# Patient Record
Sex: FEMALE | Race: WHITE | Employment: FULL TIME | URBAN - METROPOLITAN AREA
[De-identification: names, ages, dates, MRNs, and addresses within clinical notes are randomized per-mention and may not be internally consistent; named-entity substitution may affect disease eponyms.]

---
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
## 2019-01-29 NOTE — PROGRESS NOTES
SUBJECTIVE:   Ana Joshi is a 17 year old female who presents to clinic today for the following health issues:      Abnormal Mood Symptoms  Onset: August     Description:   Depression: no- feeling sad   Anxiety: YES- worry about school, work, and friends     Accompanying Signs & Symptoms:  Still participating in activities that you used to enjoy: no  Fatigue: YES  Irritability: no  Difficulty concentrating: YES  Changes in appetite: YES- fluctuates   Problems with sleep: no  Heart racing/beating fast : YES- heart beating   Thoughts of hurting yourself or others: none    History:   Recent stress: YES- college   Prior depression hospitalization: None  Family history of depression: no  Family history of anxiety: YES- mother     Precipitating factors:   Alcohol/drug use: no    Alleviating factors:      Therapies Tried and outcome: None  Patient is student at [employer], [place] , first semester, taking pre-Nursing (A & P and struggling with keeping up).  She has seen a school counselor which was helpful and comes in today for evaluation.  No prior history of depression/anxiety.  Patient denies feeling depressed but is constantly anxious about her schoolwork.  She is not sleeping well, no SI/HI.  PHQ-9=8, DAGO-7=11.  No history of ADHD, grades were good in High School.  She gets along well with her peers.    Problem list and histories reviewed & adjusted, as indicated.  Additional history: as documented    Patient Active Problem List   Diagnosis     Hyperhydrosis disorder     Acne     Infectious mononucleosis     Hx of excision of hemangioma     Snoring     Past Surgical History:   Procedure Laterality Date     TRACHEOSTOMY CHILD  birth to 2 yo     Trached until age 1 and lazer surgeries       Social History   Substance Use Topics     Smoking status: Never Smoker     Smokeless tobacco: Never Used     Alcohol use No     Family History   Problem Relation Age of Onset     Allergies Mother      Penicillin, maybe seasonal  "    Unknown/Adopted Father      Asthma Father      Allergies Father      CANCER Maternal Grandmother      Lung     CANCER Maternal Grandfather      Lymphatic     HEART DISEASE Maternal Uncle 1      of CHF         BP Readings from Last 3 Encounters:   17 131/75   17 116/70   17 109/73    Wt Readings from Last 3 Encounters:   17 159 lb 3.2 oz (72.2 kg) (89 %)*   17 151 lb (68.5 kg) (85 %)*   17 152 lb 4 oz (69.1 kg) (87 %)*     * Growth percentiles are based on CDC 2-20 Years data.                  Labs reviewed in EPIC        Reviewed and updated as needed this visit by clinical staff       Reviewed and updated as needed this visit by Provider         ROS:  Constitutional, HEENT, cardiovascular, pulmonary, gi and gu systems are negative, except as otherwise noted.      OBJECTIVE:   /75 (BP Location: Left arm, Patient Position: Sitting, Cuff Size: Adult Regular)  Pulse 82  Temp 98  F (36.7  C) (Oral)  Ht 5' 1\" (1.549 m)  Wt 159 lb 3.2 oz (72.2 kg)  SpO2 98%  BMI 30.08 kg/m2  Body mass index is 30.08 kg/(m^2).  GENERAL: healthy, alert and no distress  EYES: Eyes grossly normal to inspection, PERRL and conjunctivae and sclerae normal  HENT: ear canals and TM's normal, nose and mouth without ulcers or lesions  NECK: no adenopathy, no asymmetry, masses, or scars and thyroid normal to palpation  RESP: lungs clear to auscultation - no rales, rhonchi or wheezes  CV: regular rate and rhythm, normal S1 S2, no S3 or S4, no murmur, click or rub, no peripheral edema and peripheral pulses strong  ABDOMEN: soft, nontender, no hepatosplenomegaly, no masses and bowel sounds normal  MS: no gross musculoskeletal defects noted, no edema  SKIN: no suspicious lesions or rashes  PSYCH: mentation appears normal, affect normal/bright  LYMPH: normal ant/post cervical, supraclavicular nodes    Diagnostic Test Results:  none     ASSESSMENT/PLAN:         BP Screening:   Last 3 BP Readings:  " "  BP Readings from Last 3 Encounters:   11/22/17 131/75   04/03/17 116/70   01/27/17 109/73       The following was recommended to the patient:  Re-screen BP within a year and recommended lifestyle modifications  BMI:   Estimated body mass index is 30.08 kg/(m^2) as calculated from the following:    Height as of this encounter: 5' 1\" (1.549 m).    Weight as of this encounter: 159 lb 3.2 oz (72.2 kg).   Weight management plan: Discussed healthy diet and exercise guidelines and patient will follow up in 12 months in clinic to re-evaluate.        1. Mild single current episode of major depressive disorder (H)  We discussed at length the management of mild depression with SSRI, counseling, combination of SSRI//counseling.  She is not interested in taking a medication at this time but is willing to continue with counseling. DAP reviewed.    2. Anxiety  Patient is willing to continue with counseling through her school which I encouraged.  Return to clinic if not improved, new, or worsening symptoms and we can start SSRI if necessary.    Patient is not sexually active, declines GC screening at this time.  Approximately 25 minutes were spent face-to-face with patient and greater than 50% of that time was spent on counseling and coordination of care.     See Patient Instructions    AYDEE Jimenez [facility]  " details…

## 2019-03-21 DIAGNOSIS — F32.1 MODERATE SINGLE CURRENT EPISODE OF MAJOR DEPRESSIVE DISORDER (H): ICD-10-CM

## 2019-03-21 NOTE — TELEPHONE ENCOUNTER
"Requested Prescriptions   Pending Prescriptions Disp Refills     sertraline (ZOLOFT) 50 MG tablet 90 tablet 1          Last Written Prescription Date:  8/9/18  Last Fill Quantity: 90,  # refills: 1   Last Office Visit with G, P or [facility] prescribing provider:  8/9/18   Future Office Visit:      Sig: Take 1 tablet (50 mg) by mouth daily    SSRIs Protocol Failed - 3/21/2019 11:00 AM       Failed - PHQ-9 score less than 5 in past 6 months    Please review last PHQ-9 score.          Failed - Recent (6 mo) or future (30 days) visit within the authorizing provider's specialty    Patient had office visit in the last 6 months or has a visit in the next 30 days with authorizing provider or within the authorizing provider's specialty.  See \"Patient Info\" tab in inbasket, or \"Choose Columns\" in Meds & Orders section of the refill encounter.           Passed - Medication is active on med list       Passed - Patient is age 18 or older       Passed - No active pregnancy on record       Passed - No positive pregnancy test in last 12 months              Trevon Faarax  [facility]  "

## 2019-03-21 NOTE — LETTER
Ana Joshi  [address]  [facility] [place] [address]          03/29/19      Dear Ana Joshi        At [facility] we care about your health and are committed to providing quality patient care. Regular appointments are a vital part of the care and management of your health and can help prevent many of the complications that can occur.      It has come to our attention that you are due for an office visit to address your refill request. Please call [facility] at [phone] soon to schedule your appointment.    If you have transferred care to another clinic please call to inform us so that we do not continue to send you reminder letters.      Sincerely,      [facility] Care Team

## 2019-03-22 NOTE — TELEPHONE ENCOUNTER
Routing refill request to provider for review/approval because:  Patient has not been seen in the last six months.    Alyssa Navarro RN

## 2019-03-26 DIAGNOSIS — F32.1 MODERATE SINGLE CURRENT EPISODE OF MAJOR DEPRESSIVE DISORDER (H): ICD-10-CM

## 2019-03-26 NOTE — TELEPHONE ENCOUNTER
This writer attempted to contact 1 on 03/26/19      Reason for call schedule and left message.      If patient calls back:   Schedule Telephone Visit appointment within 1 month with PCP, document that pt called and close encounter       [email or web address], MEDICAL ASSISTANT

## 2019-03-27 NOTE — TELEPHONE ENCOUNTER
Duplicate request. See 3/21/19 refill request in Chart Review. Medication was denied to pharmacy per provider, patient needs E-visit or to be seen for any further refills.    Lucinda Mott RN

## 2019-03-28 NOTE — TELEPHONE ENCOUNTER
This writer attempted to contact Patient on 03/28/19      Reason for call Medication not refilled patient needs an office visit and left message.      If patient calls back:   Schedule Office Visit appointment within 1 week with PCP, document that pt called and close encounter         Jyoti Bennett MA

## 2019-04-24 ENCOUNTER — VIRTUAL VISIT (OUTPATIENT)
Dept: FAMILY MEDICINE | Facility: CLINIC | Age: 19
End: 2019-04-24
Payer: COMMERCIAL

## 2019-04-24 DIAGNOSIS — F32.1 MODERATE SINGLE CURRENT EPISODE OF MAJOR DEPRESSIVE DISORDER (H): ICD-10-CM

## 2019-04-24 PROCEDURE — 99441 ZZC PHYSICIAN TELEPHONE EVALUATION 5-10 MIN: CPT | Performed by: NURSE PRACTITIONER

## 2019-04-24 ASSESSMENT — ANXIETY QUESTIONNAIRES
6. BECOMING EASILY ANNOYED OR IRRITABLE: SEVERAL DAYS
2. NOT BEING ABLE TO STOP OR CONTROL WORRYING: MORE THAN HALF THE DAYS
IF YOU CHECKED OFF ANY PROBLEMS ON THIS QUESTIONNAIRE, HOW DIFFICULT HAVE THESE PROBLEMS MADE IT FOR YOU TO DO YOUR WORK, TAKE CARE OF THINGS AT HOME, OR GET ALONG WITH OTHER PEOPLE: SOMEWHAT DIFFICULT
3. WORRYING TOO MUCH ABOUT DIFFERENT THINGS: SEVERAL DAYS
5. BEING SO RESTLESS THAT IT IS HARD TO SIT STILL: SEVERAL DAYS
7. FEELING AFRAID AS IF SOMETHING AWFUL MIGHT HAPPEN: NOT AT ALL
1. FEELING NERVOUS, ANXIOUS, OR ON EDGE: NEARLY EVERY DAY
GAD7 TOTAL SCORE: 9

## 2019-04-24 ASSESSMENT — PATIENT HEALTH QUESTIONNAIRE - PHQ9
SUM OF ALL RESPONSES TO PHQ QUESTIONS 1-9: 9
5. POOR APPETITE OR OVEREATING: SEVERAL DAYS

## 2019-04-24 NOTE — PROGRESS NOTES
Patient opted to conduct today's return visit via telephone vs an in person visit to the clinic.  Shelbi Joshi MA    I spoke with: Ana    The reason for the telephone visit was:     Pertinent history and review of systems: Patient has been taking Zoloft 100 mg daily and doing well.  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  She is sleeping well, denies adverse side effect, no issues with medication compliance.    Assessment: Moderate single current episode of major depressive disorder.    Advice/instructions given to patient/guardian including prescriptions, follow up appointment or orders for diagnostic testing: follow back 1 month for PE/labs, follow up depression    Phone call contact time    Call Started at 2:46    Call Ended at: 2:53       SUBJECTIVE:   Ana Joshi is a 18 year old female who presents to clinic today for the following   health issues:      Depression and Anxiety Follow-Up    Status since last visit: Improved     Other associated symptoms:None    Complicating factors:     Significant life event: Yes-  Will be in a wedding soon     Current substance abuse: None    PHQ 1/15/2018 6/29/2018 7/10/2018   PHQ-9 Total Score 5 0 1   Q9: Thoughts of better off dead/self-harm past 2 weeks Not at all Not at all Not at all     DAGO-7 SCORE 12/28/2017 1/15/2018 6/29/2018   Total Score 7 3 1     In the past two weeks have you had thoughts of suicide or self-harm?  No.    Do you have concerns about your personal safety or the safety of others?   No  PHQ-9  English  PHQ-9   Any Language  DAGO-7  Suicide Assessment Five-step Evaluation and Treatment (SAFE-T)    Amount of exercise or physical activity: 4-5 days/week for an average of 45-60 minutes    Problems taking medications regularly: No    Medication side effects: night terrors    Diet:  regular (no restrictions)      Additional history: as documented    Reviewed  and updated as needed this visit by clinical staff         Reviewed and updated as needed this visit by Provider         Patient Active Problem List   Diagnosis     Hyperhydrosis disorder     Acne     Infectious mononucleosis     Hx of excision of hemangioma     Snoring     Moderate single current episode of major depressive disorder (H)     Class 1 obesity due to excess calories with body mass index (BMI) of 31.0 to 31.9 in adult, unspecified whether serious comorbidity present     Past Surgical History:   Procedure Laterality Date     TRACHEOSTOMY CHILD  birth to 1 yo     Trached until age 1 and lazer surgeries       Social History     Tobacco Use     Smoking status: Never Smoker     Smokeless tobacco: Never Used   Substance Use Topics     Alcohol use: No     Family History   Problem Relation Age of Onset     Allergies Mother         Penicillin, maybe seasonal     Anxiety Disorder Mother      Unknown/Adopted Father      Asthma Father      Allergies Father      Cancer Maternal Grandmother         Lung     Other Cancer Maternal Grandmother         bone     Thyroid Disease Maternal Grandmother      Cancer Maternal Grandfather         Lymphatic     Hypertension Maternal Grandfather      Cerebrovascular Disease Maternal Grandfather      Other Cancer Maternal Grandfather      Heart Disease Maternal Uncle 1         of CHF         Current Outpatient Medications   Medication Sig Dispense Refill     norethindrone-ethinyl estradiol (ALAYCEN ) 1-35 MG-MCG per tablet Take 1 tablet by mouth daily 84 tablet 3     sertraline (ZOLOFT) 50 MG tablet Take 1 tablet (50 mg) by mouth daily 90 tablet 1     BP Readings from Last 3 Encounters:   18 139/83   18 111/76   18 125/82    Wt Readings from Last 3 Encounters:   18 69.9 kg (154 lb) (85 %)*   18 71.2 kg (157 lb) (87 %)*   18 69.9 kg (154 lb) (85 %)*     * Growth  percentiles are based on CDC (Girls, 2-20 Years) data.                    ROS:  Constitutional, HEENT, cardiovascular, pulmonary, gi and gu systems are negative, except as otherwise noted.    OBJECTIVE:     There were no vitals taken for this visit.  There is no height or weight on file to calculate BMI.  Exam not performed/phone visit    Diagnostic Test Results:  none     ASSESSMENT/PLAN:       1. Moderate single current episode of major depressive disorder (H)  Refilled Zoloft, patient to follow back for PE after she is home from school at which time we'll also follow up on her depression.  Call for new/worsening symptoms.  - sertraline (ZOLOFT) 50 MG tablet; Take 1 tablet (50 mg) by mouth daily  Dispense: 90 tablet; Refill: 1    See Patient Instructions    AYDEE Jimenez [facility]

## 2019-04-25 ASSESSMENT — ANXIETY QUESTIONNAIRES: GAD7 TOTAL SCORE: 9

## 2019-05-31 ENCOUNTER — OFFICE VISIT (OUTPATIENT)
Dept: FAMILY MEDICINE | Facility: CLINIC | Age: 19
End: 2019-05-31
Payer: COMMERCIAL

## 2019-05-31 VITALS
TEMPERATURE: 98.1 F | SYSTOLIC BLOOD PRESSURE: 125 MMHG | WEIGHT: 165.4 LBS | HEIGHT: 61 IN | DIASTOLIC BLOOD PRESSURE: 85 MMHG | OXYGEN SATURATION: 95 % | BODY MASS INDEX: 31.23 KG/M2 | HEART RATE: 107 BPM

## 2019-05-31 DIAGNOSIS — F32.1 MODERATE SINGLE CURRENT EPISODE OF MAJOR DEPRESSIVE DISORDER (H): ICD-10-CM

## 2019-05-31 DIAGNOSIS — Z11.4 ENCOUNTER FOR SCREENING FOR HIV: ICD-10-CM

## 2019-05-31 DIAGNOSIS — Z00.00 ROUTINE HISTORY AND PHYSICAL EXAMINATION OF ADULT: Primary | ICD-10-CM

## 2019-05-31 DIAGNOSIS — Z13.6 CARDIOVASCULAR SCREENING; LDL GOAL LESS THAN 160: ICD-10-CM

## 2019-05-31 DIAGNOSIS — E66.09 CLASS 1 OBESITY DUE TO EXCESS CALORIES WITH BODY MASS INDEX (BMI) OF 31.0 TO 31.9 IN ADULT, UNSPECIFIED WHETHER SERIOUS COMORBIDITY PRESENT: ICD-10-CM

## 2019-05-31 DIAGNOSIS — Z11.3 SCREENING EXAMINATION FOR VENEREAL DISEASE: ICD-10-CM

## 2019-05-31 DIAGNOSIS — E66.811 CLASS 1 OBESITY DUE TO EXCESS CALORIES WITH BODY MASS INDEX (BMI) OF 31.0 TO 31.9 IN ADULT, UNSPECIFIED WHETHER SERIOUS COMORBIDITY PRESENT: ICD-10-CM

## 2019-05-31 PROCEDURE — 87491 CHLMYD TRACH DNA AMP PROBE: CPT | Performed by: NURSE PRACTITIONER

## 2019-05-31 PROCEDURE — 87591 N.GONORRHOEAE DNA AMP PROB: CPT | Performed by: NURSE PRACTITIONER

## 2019-05-31 PROCEDURE — 99213 OFFICE O/P EST LOW 20 MIN: CPT | Mod: 25 | Performed by: NURSE PRACTITIONER

## 2019-05-31 PROCEDURE — 99395 PREV VISIT EST AGE 18-39: CPT | Performed by: NURSE PRACTITIONER

## 2019-05-31 ASSESSMENT — MIFFLIN-ST. JEOR: SCORE: 1462.63

## 2019-05-31 NOTE — PROGRESS NOTES
SUBJECTIVE:   CC: Ana Joshi is an 18 year old woman who presents for preventive health visit.     Healthy Habits:    Do you get at least three servings of calcium containing foods daily (dairy, green leafy vegetables, etc.)? yes    Amount of exercise or daily activities, outside of work: 1 day(s) per week    Problems taking medications regularly No    Medication side effects: No    Have you had an eye exam in the past two years? yes    Do you see a dentist twice per year? yes    Do you have sleep apnea, excessive snoring or daytime drowsiness?no      Depression and Anxiety Follow-Up    How are you doing with your depression since your last visit? Improved     How are you doing with your anxiety since your last visit?  Improved     Are you having other symptoms that might be associated with depression or anxiety? No    Have you had a significant life event? No     Do you have any concerns with your use of alcohol or other drugs? No  She has finished school for the year, is hoping to hear on acceptance to a Nursing Program, feels her stress is less now that she's done with school, no SI/HI.  Social History     Tobacco Use     Smoking status: Never Smoker     Smokeless tobacco: Never Used   Substance Use Topics     Alcohol use: No     Drug use: No     PHQ 6/29/2018 7/10/2018 4/24/2019   PHQ-9 Total Score 0 1 9   Q9: Thoughts of better off dead/self-harm past 2 weeks Not at all Not at all Not at all     DAGO-7 SCORE 1/15/2018 6/29/2018 4/24/2019   Total Score 3 1 9       In the past two weeks have you had thoughts of suicide or self-harm?  No.    Do you have concerns about your personal safety or the safety of others?   No    Suicide Assessment Five-step Evaluation and Treatment (SAFE-T)    Today's PHQ-2 Score:   PHQ-2 ( 1999 [employer]) 5/31/2019 7/10/2018   Q1: Little interest or pleasure in doing things 0 0   Q2: Feeling down, depressed or hopeless 0 0   PHQ-2 Score 0 0       Abuse: Current or Past(Physical,  Sexual or Emotional)- No  Do you feel safe in your environment? Yes    Social History     Tobacco Use     Smoking status: Never Smoker     Smokeless tobacco: Never Used   Substance Use Topics     Alcohol use: No     If you drink alcohol do you typically have >3 drinks per day or >7 drinks per week? No                     Reviewed orders with patient.  Reviewed health maintenance and updated orders accordingly - Yes  Labs reviewed in EPIC  BP Readings from Last 3 Encounters:   05/31/19 125/85   08/09/18 139/83   06/29/18 111/76    Wt Readings from Last 3 Encounters:   05/31/19 75 kg (165 lb 6.4 oz) (90 %)*   08/09/18 69.9 kg (154 lb) (85 %)*   06/29/18 71.2 kg (157 lb) (87 %)*     * Growth percentiles are based on [facility] (Girls, 2-20 Years) data.                  Patient Active Problem List   Diagnosis     Hyperhydrosis disorder     Acne     Infectious mononucleosis     Hx of excision of hemangioma     Snoring     Moderate single current episode of major depressive disorder (H)     Class 1 obesity due to excess calories with body mass index (BMI) of 31.0 to 31.9 in adult, unspecified whether serious comorbidity present     Past Surgical History:   Procedure Laterality Date     TRACHEOSTOMY CHILD  birth to 1 yo     Trached until age 1 and lazer surgeries       Social History     Tobacco Use     Smoking status: Never Smoker     Smokeless tobacco: Never Used   Substance Use Topics     Alcohol use: No     Family History   Problem Relation Age of Onset     Allergies Mother         Penicillin, maybe seasonal     Anxiety Disorder Mother      Unknown/Adopted Father      Asthma Father      Allergies Father      Cancer Maternal Grandmother         Lung     Other Cancer Maternal Grandmother         bone     Thyroid Disease Maternal Grandmother      Cancer Maternal Grandfather         Lymphatic     Hypertension Maternal Grandfather      Cerebrovascular Disease Maternal Grandfather      Other Cancer Maternal Grandfather      Heart  "Disease Maternal Uncle 1         of CHF           Mammogram not appropriate for this patient based on age.    Pertinent mammograms are reviewed under the imaging tab.  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       Past Surgical History:   Procedure Laterality Date     TRACHEOSTOMY CHILD  birth to 1 yo     Trached until age 1 and lazer surgeries       ROS:  CONSTITUTIONAL: NEGATIVE for fever, chills, change in weight  INTEGUMENTARU/SKIN: NEGATIVE for worrisome rashes, moles or lesions  EYES: NEGATIVE for vision changes or irritation  ENT: NEGATIVE for ear, mouth and throat problems  RESP: NEGATIVE for significant cough or SOB  BREAST: NEGATIVE for masses, tenderness or discharge  CV: NEGATIVE for chest pain, palpitations or peripheral edema  GI: NEGATIVE for nausea, abdominal pain, heartburn, or change in bowel habits  : NEGATIVE for unusual urinary or vaginal symptoms. Periods are regular.  MUSCULOSKELETAL: NEGATIVE for significant arthralgias or myalgia  NEURO: NEGATIVE for weakness, dizziness or paresthesias  ENDOCRINE: NEGATIVE for temperature intolerance, skin/hair changes  PSYCHIATRIC: NEGATIVE for changes in mood or affect    OBJECTIVE:   /85   Pulse 107   Temp 98.1  F (36.7  C) (Oral)   Ht 1.549 m (5' 1\")   Wt 75 kg (165 lb 6.4 oz)   SpO2 95%   BMI 31.25 kg/m    EXAM:  GENERAL: healthy, alert and no distress  EYES: Eyes grossly normal to inspection, PERRL and conjunctivae and sclerae normal  HENT: ear canals and TM's normal, nose and mouth without ulcers or lesions  NECK: no adenopathy, no asymmetry, " masses, or scars and thyroid normal to palpation  RESP: lungs clear to auscultation - no rales, rhonchi or wheezes  BREAST: normal without masses, tenderness or nipple discharge and no palpable axillary masses or adenopathy  CV: regular rate and rhythm, normal S1 S2, no S3 or S4, no murmur, click or rub, no peripheral edema and peripheral pulses strong  ABDOMEN: soft, nontender, no hepatosplenomegaly, no masses and bowel sounds normal   (female): deferred  MS: no gross musculoskeletal defects noted, no edema  SKIN: no suspicious lesions or rashes  NEURO: Normal strength and tone, mentation intact and speech normal  PSYCH: mentation appears normal, affect normal/bright  LYMPH: no cervical, supraclavicular, axillary, or inguinal adenopathy    Diagnostic Test Results:  Labs reviewed in Epic  No results found for this or any previous visit (from the past 24 hour(s)).    ASSESSMENT/PLAN:   1. Routine history and physical examination of adult    - Glucose; Future    2. Screening examination for venereal disease  Safer sex practices encouraged  - NEISSERIA GONORRHOEA PCR  - CHLAMYDIA TRACHOMATIS PCR    3. Moderate single current episode of major depressive disorder (H)  Improved on 50 mg Zoloft, continue current dose, follow back 1 month ( E visit or phone visit OK) fpr recheck at which time we can consider increasing her dose     4. Class 1 obesity due to excess calories with body mass index (BMI) of 31.0 to 31.9 in adult, unspecified whether serious comorbidity present  Benefits of weight loss reviewed in detail, encouraged her to cut back on the carbohydrates in the diet, consume more fruits and vegetables, drink plenty of water, avoid fruit juices, sodas, get 150 min moderate exercise/week.  Recheck weight in 6 months.    5. CARDIOVASCULAR SCREENING; LDL GOAL LESS THAN 160    - Lipid panel reflex to direct LDL Fasting; Future    6. Encounter for screening for HIV  =  - HIV Screening; Future    COUNSELING:   Reviewed  "preventive health counseling, as reflected in patient instructions    Estimated body mass index is 31.25 kg/m  as calculated from the following:    Height as of this encounter: 1.549 m (5' 1\").    Weight as of this encounter: 75 kg (165 lb 6.4 oz).    Weight management plan: Patient referred to endocrine and/or weight management specialty     reports that she has never smoked. She has never used smokeless tobacco.      Counseling Resources:  ATP IV Guidelines  Pooled Cohorts Equation Calculator  Breast Cancer Risk Calculator  FRAX Risk Assessment  ICSI Preventive Guidelines  Dietary Guidelines for Americans, 2010  USDA's MyPlate  ASA Prophylaxis  Lung CA Screening    AYDEE Jimenez [facility]  "

## 2019-05-31 NOTE — PATIENT INSTRUCTIONS
Preventive Health Recommendations  Female Ages 18 to 20     Yearly exam:     See your health care provider every year in order to  o Review health changes.   o Discuss preventive care.    o Review your medicines if your doctor has prescribed any.      You should be tested each year for STDs (sexually transmitted diseases).       After age 20, talk to your provider about how often you should have cholesterol testing.      If you are at risk for diabetes, you should have a diabetes test (fasting glucose).     Shots:     Get a flu shot each year.     Get a tetanus shot every 10 years.     Consider getting the shot (vaccine) that prevents cervical cancer (Gardasil).    Nutrition:     Eat at least 5 servings of fruits and vegetables each day.    Eat whole-grain bread, whole-wheat pasta and brown rice instead of white grains and rice.    Get adequate Calcium and Vitamin D.     Lifestyle    Exercise at least 150 minutes a week each week (30 minutes a day, 5 days a week). This will help you control your weight and prevent disease.    No smoking.     Wear sunscreen to prevent skin cancer.    See your dentist every six months for an exam and cleaning.    At [facility], we strive to deliver an exceptional experience to you, every time we see you.  If you receive a survey in the mail, please send us back your thoughts. We really do value your feedback.    Your care team:                            Family Medicine Internal Medicine   MD Brooks Pandya MD Shantel Branch-Fleming, MD Katya Georgiev PA-C Megan Hill, AYDEE Ventura MD Pediatrics   IOANA De La Torre, MD Tila Winkler APRN CNP   MD Lexii Coleman MD Deborah Mielke, MD Kim Thein, APRN [facility]      Clinic hours: Monday - Thursday 7 am-7 pm; Fridays 7 am-5 pm.   Urgent care: Monday - Friday 11 am-9 pm; Saturday and Sunday 9 am-5 pm.  Pharmacy : Monday -Thursday 8 am-8  pm; Friday 8 am-6 pm; Saturday and Sunday 9 am-5 pm.     Clinic: [phone]   Pharmacy: [phone]        Patient Education     Depression Affects Your Mind and Body    Everyone feels sad or  blue  from time to time for a few days or weeks. Depression is when these feelings don't go away and they interfere with daily life.  Depression is a real illness that can develop at any age. It is one of the most common mental health problems in the [place] Depression makes you feel sad, helpless, and hopeless. It gets in the way of your life and relationships. It inhibits your ability to think and act. But, with help, you can feel better again.  Depression affects your whole body  Brain chemicals affect your body as well as your mood. So depression may do more than just make you feel low. You may also feel bad physically. Depression can:    Cause trouble with mental tasks such as remembering, concentrating, or making decisions    Make you feel nervous and jumpy    Cause trouble sleeping. Or you may sleep too much    Change your appetite    Cause headaches, stomachaches, or other aches and pains    Drain your body of energy  Depression and other illness  It is common for people who have chronic health problems to also have depression. It can often be hard to tell which one caused the other. A person might become depressed after finding out they have a health problem. But some studies suggest being depressed may make certain health problems more likely. And some depressed people stop taking care of themselves. This may make them more likely to get sick.  Date Last Reviewed: 1/1/2017 2000-2018 [employer]. [address] [place] [address], [place] [address]. All rights reserved. This information is not intended as a substitute for professional medical care. Always follow your healthcare professional's instructions.           Patient Education     Prevention Guidelines, Women Ages 18 to 39  Screening tests and  vaccines are an important part of managing your health. A screening test is done to find possible disorders or diseases in people who don't have any symptoms. The goal is to find a disease early so lifestyle changes can be made and you can be watched more closely to reduce the risk of disease, or to detect it early enough to treat it most effectively. Screening tests are not considered diagnostic, but are used to determine if more testing is needed. Health counseling is essential, too. Below are guidelines for these, for women ages 18 to 39. Talk with your healthcare provider to make sure you re up-to-date on what you need.  Screening Who needs it How often   Alcohol misuse All women in this age group At routine exams   Blood pressure All women in this age group Yearly checkup if your blood pressure is normal  Normal blood pressure is less than 120/80 mm Hg  If your blood pressure reading is higher than normal, follow the advice of your healthcare provider   Breast cancer All women in this age group should talk with their healthcare providers about the need for clinical breast exams (CBE)1 Clinical breast exam every 3 years1   Cervical cancer Women ages 21 and older Women between ages 21 and 29 should have a Pap test every 3 years; women between ages 30 and 65 are advised to have a Pap test plus an HPV test every 5 years   Chlamydia Sexually active women ages 25 and younger, and women at increased risk for infection (such as having multiple sex partners) Every year if you're at risk or have symptoms   Depression All women in this age group At routine exams   Type 2 diabetes, prediabetes All women with no symptoms who are overweight or obese and have 1 or more other risk factors for diabetes At least every 3 years. Also, testing for diabetes during pregnancy after the 24th week.    Type 2 diabetes, prediabetes All women diagnosed with gestational diabetes Lifelong testing every 3 years   Type 2 diabetes All women  with prediabetes Every year   Gonorrhea Sexually active women at increased risk for infection At routine exams   Hepatitis C Anyone at increased risk At routine exams   HIV All women should be tested at least once for HIV between the ages of 13 and 64 At routine exams. Those with risk factors for HIV should be tested at least annually.    Obesity All women in this age group At routine exams   Syphilis Women at increased risk for infection should talk with their healthcare provider At routine exams   Tuberculosis Women at increased risk for infection should talk with their healthcare provider Ask your healthcare provider   Vision All women in this age group At least 1 complete exam in your 20s, and 2 in your 30s   Vaccine2 Who needs it How often   Chickenpox (varicella) All women in this age group who have no record of this infection or vaccine 2 doses; the second dose should be given 4 to 8 weeks after the first dose   Hepatitis A Women at increased risk for infection should talk with their healthcare provider 2 doses given at least 6 months apart   Hepatitis B Women at increased risk for infection should talk with their healthcare provider 3 doses over 6 months; second dose should be given 1 month after the first dose; the third dose should be given at least 2 months after the second dose and at least 4 months after the first dose   Haemophilus influenzae Type B (HIB) Women at increased risk for infection should talk with their healthcare provider 1 to 3 doses   Human papillomavirus (HPV) All women in this age group up to age 26 3 doses; the second dose should be given 1 to 2 months after the first dose and the third dose given 6 months after the first dose   Influenza (flu) All women in this age group Once a year   Measles, mumps, rubella (MMR) All women in this age group who have no record of these infections or vaccines 1 or 2 doses   Meningococcal Women at increased risk for infection should talk with their  healthcare provider 1 or more doses   Pneumococcal conjugate vaccine (PCV13) and pneumococcal polysaccharide vaccine (PPSV23) Women at increased risk for infection should talk with their healthcare provider PCV13: 1 dose ages 19 to 65 (protects against 13 types of pneumococcal bacteria)  PPSV23: 1 to 2 doses through age 64, or 1 dose at 65 or older (protects against 23 types of pneumococcal bacteria)      Tetanus/diphtheria/pertussis (Td/Tdap) booster All women in this age group Td every 10 years, or a one-time dose of Tdap instead of a Td booster after age 18, then Td every 10 years   Counseling Who needs it How often   BRCA gene mutation testing for breast and ovarian cancer susceptibility Women with increased risk for having gene mutation When your risk is known   Breast cancer and chemoprevention Women at high risk for breast cancer When your risk is known   Diet and exercise Women who are overweight or obese When diagnosed, and then at routine exams   Domestic violence Women at the age in which they are able to have children At routine exams   Sexually transmitted infection prevention Women who are sexually active At routine exams   Skin cancer Prevention of skin cancer in fair-skinned adults At routine exams   Use of tobacco and the health effects it can cause All women in this age group Every visit   1 According to the ACS, women ages 20 to 39 years should have a clinical breast exam (CBE) as part of their routine health exam every 3 years. Breast self-exams are an option for women starting in their 20s. But the USPSTF does not recommend CBE.  Date Last Reviewed: 10/1/2017    5927-9227 [employer]. [address] [place] [address], [place] [address]. All rights reserved. This information is not intended as a substitute for professional medical care. Always follow your healthcare professional's instructions.

## 2019-06-02 LAB
C TRACH DNA SPEC QL NAA+PROBE: NEGATIVE
N GONORRHOEA DNA SPEC QL NAA+PROBE: NEGATIVE
SPECIMEN SOURCE: NORMAL
SPECIMEN SOURCE: NORMAL

## 2019-07-11 DIAGNOSIS — N94.6 DYSMENORRHEA: ICD-10-CM

## 2019-07-15 RX ORDER — NORETHINDRONE AND ETHINYL ESTRADIOL 1 MG-35MCG
KIT ORAL
Qty: 84 TABLET | Refills: 2 | Status: SHIPPED | OUTPATIENT
Start: 2019-07-15 | End: 2020-03-11

## 2019-07-17 DIAGNOSIS — Z00.00 ROUTINE HISTORY AND PHYSICAL EXAMINATION OF ADULT: ICD-10-CM

## 2019-07-17 DIAGNOSIS — Z13.6 CARDIOVASCULAR SCREENING; LDL GOAL LESS THAN 160: ICD-10-CM

## 2019-07-17 DIAGNOSIS — Z11.4 ENCOUNTER FOR SCREENING FOR HIV: ICD-10-CM

## 2019-07-17 LAB
CHOLEST SERPL-MCNC: 193 MG/DL
GLUCOSE SERPL-MCNC: 87 MG/DL (ref 70–99)
HDLC SERPL-MCNC: 50 MG/DL
LDLC SERPL CALC-MCNC: 118 MG/DL
NONHDLC SERPL-MCNC: 143 MG/DL
TRIGL SERPL-MCNC: 125 MG/DL

## 2019-07-17 PROCEDURE — 82947 ASSAY GLUCOSE BLOOD QUANT: CPT | Performed by: NURSE PRACTITIONER

## 2019-07-17 PROCEDURE — 87389 HIV-1 AG W/HIV-1&-2 AB AG IA: CPT | Performed by: NURSE PRACTITIONER

## 2019-07-17 PROCEDURE — 80061 LIPID PANEL: CPT | Performed by: NURSE PRACTITIONER

## 2019-07-17 PROCEDURE — 36415 COLL VENOUS BLD VENIPUNCTURE: CPT | Performed by: NURSE PRACTITIONER

## 2019-07-18 LAB — HIV 1+2 AB+HIV1 P24 AG SERPL QL IA: NONREACTIVE

## 2019-11-27 DIAGNOSIS — F32.1 MODERATE SINGLE CURRENT EPISODE OF MAJOR DEPRESSIVE DISORDER (H): ICD-10-CM

## 2019-11-27 NOTE — TELEPHONE ENCOUNTER
"Requested Prescriptions   Pending Prescriptions Disp Refills     sertraline (ZOLOFT) 50 MG tablet [Pharmacy Med Name: SERTRALINE HCL 50 MG TABLET]  Last Written Prescription Date:  4/24/19  Last Fill Quantity: 90,  # refills: 1   Last office visit: 5/31/2019 with prescribing provider:  Lexi   Future Office Visit:     90 tablet 1     Sig: TAKE 1 TABLET BY MOUTH EVERY DAY       SSRIs Protocol Failed - 11/27/2019  5:44 AM        Failed - PHQ-9 score less than 5 in past 6 months     Please review last PHQ-9 score.           Passed - Medication is active on med list        Passed - Patient is age 18 or older        Passed - No active pregnancy on record        Passed - No positive pregnancy test in last 12 months        Passed - Recent (6 mo) or future (30 days) visit within the authorizing provider's specialty     Patient had office visit in the last 6 months or has a visit in the next 30 days with authorizing provider or within the authorizing provider's specialty.  See \"Patient Info\" tab in inbasket, or \"Choose Columns\" in Meds & Orders section of the refill encounter.              "

## 2019-11-29 NOTE — TELEPHONE ENCOUNTER
PHQ-9 score:    PHQ-9 SCORE 4/24/2019   PHQ-9 Total Score 9       Medication is being filled for 1 time refill only due to:  needs updated PHQ-9 and OV for depression follow up   30 day abraham refill given    Lucinda Mott RN  [facility]/ [facility]

## 2020-02-07 ENCOUNTER — VIRTUAL VISIT (OUTPATIENT)
Dept: FAMILY MEDICINE | Facility: CLINIC | Age: 20
End: 2020-02-07
Payer: COMMERCIAL

## 2020-02-07 DIAGNOSIS — F32.1 MODERATE SINGLE CURRENT EPISODE OF MAJOR DEPRESSIVE DISORDER (H): ICD-10-CM

## 2020-02-07 PROCEDURE — 99441 ZZC PHYSICIAN TELEPHONE EVALUATION 5-10 MIN: CPT | Performed by: NURSE PRACTITIONER

## 2020-02-07 ASSESSMENT — ANXIETY QUESTIONNAIRES
5. BEING SO RESTLESS THAT IT IS HARD TO SIT STILL: MORE THAN HALF THE DAYS
IF YOU CHECKED OFF ANY PROBLEMS ON THIS QUESTIONNAIRE, HOW DIFFICULT HAVE THESE PROBLEMS MADE IT FOR YOU TO DO YOUR WORK, TAKE CARE OF THINGS AT HOME, OR GET ALONG WITH OTHER PEOPLE: NOT DIFFICULT AT ALL
GAD7 TOTAL SCORE: 6
2. NOT BEING ABLE TO STOP OR CONTROL WORRYING: SEVERAL DAYS
6. BECOMING EASILY ANNOYED OR IRRITABLE: NOT AT ALL
7. FEELING AFRAID AS IF SOMETHING AWFUL MIGHT HAPPEN: NOT AT ALL
1. FEELING NERVOUS, ANXIOUS, OR ON EDGE: MORE THAN HALF THE DAYS
3. WORRYING TOO MUCH ABOUT DIFFERENT THINGS: SEVERAL DAYS

## 2020-02-07 ASSESSMENT — PATIENT HEALTH QUESTIONNAIRE - PHQ9
SUM OF ALL RESPONSES TO PHQ QUESTIONS 1-9: 4
5. POOR APPETITE OR OVEREATING: NOT AT ALL

## 2020-02-07 NOTE — PROGRESS NOTES
Patient opted to conduct today's return visit via telephone vs an in person visit to the clinic.    I spoke with: Ana    The reason for the telephone visit was: Refill Zoloft.   Pertinent history and review of systems:  Doing well on Zoloft- notes that she sometimes has nightmares and wonders if the Zoloft could be causing the,  She doesn't have them every night, denies SI/HI, is able to get back to sleep when she has them, denies trauma/abuse history. 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 PHQ-9=4, DAGO-7=6.      Assessment: Moderate single e[pisode of major depressive disorder (H)  Continue current 50 mg Zoloft daily.    Advice/instructions given to patient/guardian including prescriptions, follow up appointment or orders for diagnostic testin months, sooner if new/worsening symptoms.    Phone call contact time    Call Started at: 08    Call Ended at: 08

## 2020-02-08 ASSESSMENT — ANXIETY QUESTIONNAIRES: GAD7 TOTAL SCORE: 6

## 2020-03-09 DIAGNOSIS — N94.6 DYSMENORRHEA: ICD-10-CM

## 2020-03-09 NOTE — TELEPHONE ENCOUNTER
"Requested Prescriptions   Pending Prescriptions Disp Refills     ALAYCEN 1/35 1-35 MG-MCG tablet [Pharmacy Med Name: ALYACEN 1-35 28 TABLET]  Last Written Prescription Date:  07/15/19  Last Fill Quantity: 84,  # refills: 2   Last Office Visit with [facility] or [facility] prescribing provider:  05/31/19-Thein   Future Office Visit:    84 tablet 0     Sig: TAKE 1 TABLET BY MOUTH EVERY DAY       Contraceptives Protocol Passed - 3/9/2020  7:21 AM        Passed - Patient is not a current smoker if age is 35 or older        Passed - Recent (12 mo) or future (30 days) visit within the authorizing provider's specialty     Patient has had an office visit with the authorizing provider or a provider within the authorizing providers department within the previous 12 mos or has a future within next 30 days. See \"Patient Info\" tab in inApliiqet, or \"Choose Columns\" in Meds & Orders section of the refill encounter.              Passed - Medication is active on med list        Passed - No active pregnancy on record        Passed - No positive pregnancy test in past 12 months       Hormone Replacement Therapy Passed - 3/9/2020  7:21 AM        Passed - Blood pressure under 140/90 in past 12 months     BP Readings from Last 3 Encounters:   05/31/19 125/85   08/09/18 139/83   06/29/18 111/76                 Passed - Recent (12 mo) or future (30 days) visit within the authorizing provider's specialty     Patient has had an office visit with the authorizing provider or a provider within the authorizing providers department within the previous 12 mos or has a future within next 30 days. See \"Patient Info\" tab in inApliiqet, or \"Choose Columns\" in Meds & Orders section of the refill encounter.              Passed - Medication is active on med list        Passed - Patient is 18 years of age or older        Passed - No active pregnancy on record        Passed - No positive pregnancy test on record in past 12 months             "

## 2020-03-11 RX ORDER — NORETHINDRONE AND ETHINYL ESTRADIOL 1 MG-35MCG
KIT ORAL
Qty: 84 TABLET | Refills: 0 | Status: SHIPPED | OUTPATIENT
Start: 2020-03-11 | End: 2020-06-03

## 2020-03-11 NOTE — TELEPHONE ENCOUNTER
Prescription approved per [facility] Refill Protocol.    Amy Khan RN  [place]

## 2020-05-20 ENCOUNTER — VIRTUAL VISIT (OUTPATIENT)
Dept: FAMILY MEDICINE | Facility: CLINIC | Age: 20
End: 2020-05-20
Payer: COMMERCIAL

## 2020-05-20 VITALS — WEIGHT: 143 LBS | HEIGHT: 60 IN | BODY MASS INDEX: 28.07 KG/M2

## 2020-05-20 DIAGNOSIS — F32.1 MODERATE SINGLE CURRENT EPISODE OF MAJOR DEPRESSIVE DISORDER (H): ICD-10-CM

## 2020-05-20 PROCEDURE — 99214 OFFICE O/P EST MOD 30 MIN: CPT | Mod: 95 | Performed by: NURSE PRACTITIONER

## 2020-05-20 PROCEDURE — 96127 BRIEF EMOTIONAL/BEHAV ASSMT: CPT | Mod: 59 | Performed by: NURSE PRACTITIONER

## 2020-05-20 ASSESSMENT — PAIN SCALES - GENERAL: PAINLEVEL: NO PAIN (0)

## 2020-05-20 ASSESSMENT — ANXIETY QUESTIONNAIRES
3. WORRYING TOO MUCH ABOUT DIFFERENT THINGS: SEVERAL DAYS
IF YOU CHECKED OFF ANY PROBLEMS ON THIS QUESTIONNAIRE, HOW DIFFICULT HAVE THESE PROBLEMS MADE IT FOR YOU TO DO YOUR WORK, TAKE CARE OF THINGS AT HOME, OR GET ALONG WITH OTHER PEOPLE: SOMEWHAT DIFFICULT
GAD7 TOTAL SCORE: 2
7. FEELING AFRAID AS IF SOMETHING AWFUL MIGHT HAPPEN: NOT AT ALL
2. NOT BEING ABLE TO STOP OR CONTROL WORRYING: SEVERAL DAYS
1. FEELING NERVOUS, ANXIOUS, OR ON EDGE: NOT AT ALL
5. BEING SO RESTLESS THAT IT IS HARD TO SIT STILL: NOT AT ALL
6. BECOMING EASILY ANNOYED OR IRRITABLE: NOT AT ALL

## 2020-05-20 ASSESSMENT — MIFFLIN-ST. JEOR: SCORE: 1340.14

## 2020-05-20 ASSESSMENT — PATIENT HEALTH QUESTIONNAIRE - PHQ9
SUM OF ALL RESPONSES TO PHQ QUESTIONS 1-9: 3
5. POOR APPETITE OR OVEREATING: NOT AT ALL

## 2020-05-20 NOTE — PROGRESS NOTES
"Ana Joshi is a 19 year old female who is being evaluated via a billable telephone visit.      The patient has been notified of following:     \"This telephone visit will be conducted via a call between you and your physician/provider. We have found that certain health care needs can be provided without the need for a physical exam.  This service lets us provide the care you need with a short phone conversation.  If a prescription is necessary we can send it directly to your pharmacy.  If lab work is needed we can place an order for that and you can then stop by our lab to have the test done at a later time.    Telephone visits are billed at different rates depending on your insurance coverage. During this emergency period, for some insurers they may be billed the same as an in-person visit.  Please reach out to your insurance provider with any questions.    If during the course of the call the physician/provider feels a telephone visit is not appropriate, you will not be charged for this service.\"    Patient has given verbal consent for Telephone visit?  Yes    What phone number would you like to be contacted at? [phone]    How would you like to obtain your AVS? Mail a copy    Subjective     Ana Joshi is a 19 year old female who presents via phone visit today for the following health issues:    HPI  Depression Followup    How are you doing with your depression since your last visit? Having side effects     Are you having other symptoms that might be associated with depression? No    Have you had a significant life event?  Housing Concerns -    Are you feeling anxious or having panic attacks?   Yes:  sometimes     Do you have any concerns with your use of alcohol or other drugs? No  Patient is having nightmares on the Zoloft, she is also having a 'weird twitching motion when sleeping\" as noted by her boyfriend and friends. No jaw clenching, no seizure history, no head injury/trauma/abuse " history. 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  Social History     Tobacco Use     Smoking status: Never Smoker     Smokeless tobacco: Never Used   Substance Use Topics     Alcohol use: No     Drug use: No     PHQ 4/24/2019 2/7/2020 5/20/2020   PHQ-9 Total Score 9 4 3   Q9: Thoughts of better off dead/self-harm past 2 weeks Not at all Not at all Not at all     DAGO-7 SCORE 4/24/2019 2/7/2020 5/20/2020   Total Score 9 6 2     Last PHQ-9 5/20/2020   1.  Little interest or pleasure in doing things 0   2.  Feeling down, depressed, or hopeless 1   3.  Trouble falling or staying asleep, or sleeping too much 0   4.  Feeling tired or having little energy 0   5.  Poor appetite or overeating 2   6.  Feeling bad about yourself 0   7.  Trouble concentrating 0   8.  Moving slowly or restless 0   Q9: Thoughts of better off dead/self-harm past 2 weeks 0   PHQ-9 Total Score 3   Difficulty at work, home, or with people Somewhat difficult     DAGO-7  5/20/2020   1. Feeling nervous, anxious, or on edge 0   2. Not being able to stop or control worrying 1   3. Worrying too much about different things 1   4. Trouble relaxing 0   5. Being so restless that it is hard to sit still 0   6. Becoming easily annoyed or irritable 0   7. Feeling afraid, as if something awful might happen 0   DAGO-7 Total Score 2   If you checked any problems, how difficult have they made it for you to do your work, take care of things at home, or get along with other people? Somewhat difficult     In the past two weeks have you had thoughts of suicide or self-harm?  No.    Do you have concerns about your personal safety or the safety of others?   No    Suicide Assessment Five-step Evaluation and Treatment (SAFE-T)      How many servings of fruits and vegetables do you eat daily?  4 or more    On average, how many sweetened beverages do you drink each day (Examples: soda, juice, sweet  tea, etc.  Do NOT count diet or artificially sweetened beverages)?   0    How many days per week do you exercise enough to make your heart beat faster? 4    How many minutes a day do you exercise enough to make your heart beat faster? 30 - 60  How many days per week do you miss taking your medication? 1    What makes it hard for you to take your medications?  remembering to take      Patient Active Problem List   Diagnosis     Hyperhydrosis disorder     Acne     Infectious mononucleosis     Hx of excision of hemangioma     Snoring     Moderate single current episode of major depressive disorder (H)     Class 1 obesity due to excess calories with body mass index (BMI) of 31.0 to 31.9 in adult, unspecified whether serious comorbidity present     Past Surgical History:   Procedure Laterality Date     TRACHEOSTOMY CHILD  birth to 1 yo     Trached until age 1 and lazer surgeries       Social History     Tobacco Use     Smoking status: Never Smoker     Smokeless tobacco: Never Used   Substance Use Topics     Alcohol use: No     Family History   Problem Relation Age of Onset     Allergies Mother         Penicillin, maybe seasonal     Anxiety Disorder Mother      Unknown/Adopted Father      Asthma Father      Allergies Father      Cancer Maternal Grandmother         Lung     Other Cancer Maternal Grandmother         bone     Thyroid Disease Maternal Grandmother      Cancer Maternal Grandfather         Lymphatic     Hypertension Maternal Grandfather      Cerebrovascular Disease Maternal Grandfather      Other Cancer Maternal Grandfather      Heart Disease Maternal Uncle 1         of CHF         Current Outpatient Medications   Medication Sig Dispense Refill     ALAYCEN  1-35 MG-MCG tablet TAKE 1 TABLET BY MOUTH EVERY DAY 84 tablet 0     FLUoxetine (PROZAC) 20 MG capsule Take 1 capsule (20 mg) by mouth daily 90 capsule 1     BP Readings from Last 3 Encounters:   19 125/85   18 139/83   18 111/76     Wt Readings from Last 3 Encounters:   05/20/20 64.9 kg (143 lb)   05/31/19 75 kg (165 lb 6.4 oz) (90 %, Z= 1.27)*   08/09/18 69.9 kg (154 lb) (85 %, Z= 1.03)*     * Growth percentiles are based on [facility] (Girls, 2-20 Years) data.                    Reviewed and updated as needed this visit by Provider         Review of Systems   Constitutional, HEENT, cardiovascular, pulmonary, gi and gu systems are negative, except as otherwise noted.       Objective   Reported vitals:  Ht 1.524 m (5')   Wt 64.9 kg (143 lb)   LMP 05/20/2020   Breastfeeding No   BMI 27.93 kg/m     healthy, alert and no distress  PSYCH: Alert and oriented times 3; coherent speech, normal   rate and volume, able to articulate logical thoughts, able   to abstract reason, no tangential thoughts, no hallucinations   or delusions  Her affect is normal and pleasant  RESP: No cough, no audible wheezing, able to talk in full sentences  Remainder of exam unable to be completed due to telephone visits    Diagnostic Test Results:  Labs reviewed in Epic  none         Assessment/Plan:  1. Moderate single current episode of major depressive disorder (H)  Stop Zoloft, will start Prozac daily and she is to follow back in 3 weeks for medication check. I've explained to her that drugs of the SSRI class can have side effects such as weight gain, sexual dysfunction, insomnia, headache, nausea. These medications are generally effective at alleviating symptoms of anxiety and/or depression. Let me know if significant side effects do occur. Also referring for counseling.  - FLUoxetine (PROZAC) 20 MG capsule; Take 1 capsule (20 mg) by mouth daily  Dispense: 90 capsule; Refill: 1  - MENTAL HEALTH REFERRAL  - Adult; Outpatient Treatment; Individual/Couples/Family/Group Therapy/Health Psychology; [facility] [phone]; We will contact you to schedule the appointment or please call with any questions    No follow-ups on file.      Phone call duration:   15:58 minutes    AYDEE Jimenez CNP

## 2020-05-20 NOTE — PATIENT INSTRUCTIONS
At [facility], we strive to deliver an exceptional experience to you, every time we see you. If you receive a survey, please complete it as we do value your feedback.  If you have MyChart, you can expect to receive results automatically within 24 hours of their completion.  Your provider will send a note interpreting your results as well.   If you do not have MyChart, you should receive your results in about a week by mail.    Your care team:                            Family Medicine Internal Medicine   MD Brooks Pandya MD Shantel Branch-Fleming, MD Katya Georgiev PA-C Megan Hill, APRN CNP    Valdo Ventura, MD Pediatrics   Joaquim Aguilar, PAJS Beckwith, MD Tila Winkler APRN CNP   MD Lexii Coleman MD Deborah Mielke, MD Kim Thein, APRN [facility]      Clinic hours: Monday - Thursday 7 am-7 pm; Fridays 7 am-5 pm.   Urgent care: Monday - Friday 11 am-9 pm; Saturday and Sunday 9 am-5 pm.    Clinic: [phone]       [facility]: Monday - Thursday 8 am - 7 pm; Friday 8 am - 6 pm  [facility]: [phone]     Use www.oncare.org for 24/7 diagnosis and treatment of dozens of conditions.  Patient Education     Depression: Tips to Help Yourself    As your healthcare providers help treat your depression, you can also help yourself. Keep in mind that your illness affects you emotionally, physically, mentally, and socially. So full recovery will take time. Take care of your body and your soul, and be patient with yourself as you get better.  Self-care    Educate yourself. Read about treatment and medicine options. If you have the energy, attend local conferences or support groups. Keep a list of useful websites and helpful books and use them as needed. This illness is not your fault. Don t blame yourself for your depression.    Manage early symptoms. If you notice symptoms returning, experience  triggers, or identify other factors that may lead to a depressive episode, get help as soon as possible. Ask trusted friends and family to monitor your behavior and let you know if they see anything of concern.    Work with your provider. Find a provider you can trust. Communicate honestly with that person and share information on your treatment for depression and your reaction to medicines.    Be prepared for a crisis. Know what to do if you experience a crisis. Keep the phone number of a crisis hotline and know the location of your community's urgent care centers and the closest emergency department.    Hold off on big decisions. Depression can cloud your judgment. So wait until you feel better before making major life decisions, such as changing jobs, moving, or getting  or .    Be patient. Recovering from depression is a process. Don t be discouraged if it takes some time to feel better.    Keep it simple. Depression saps your energy and concentration. So you won t be able to do all the things you used to do. Set small goals and do what you can.    Be with others. Don t isolate yourself--you ll only feel worse. Try to be with other people. And take part in fun activities when you can. Go to a movie, ballgame, [employer] service, or social event. Talk openly with people you can trust. And accept help when it s offered.  Take care of your body  People with depression often lose the desire to take care of themselves. That only makes their problems worse. During treatment and afterward, make a point to:    Exercise. It s a great way to take care of your body. And studies have shown that exercise helps fight depression.    Avoid drugs and alcohol. These may ease the pain in the short term. But they ll only make your problems worse in the long run.    Get relief from stress. Ask your healthcare provider for relaxation exercises and techniques to help relieve stress.    Eat right. A balanced and healthy  diet helps keep your body healthy.  Date Last Reviewed: 1/1/2017 2000-2019 [employer]. [address] [place] [address], [place] [address]. All rights reserved. This information is not intended as a substitute for professional medical care. Always follow your healthcare professional's instructions.           Patient Education     Fluoxetine capsules or tablets (Depression/Mood Disorders)  Brand Name: Prozac  What is this medicine?  FLUOXETINE (floo OX e teen) belongs to a class of drugs known as selective serotonin reuptake inhibitors (SSRIs). It helps to treat mood problems such as depression, obsessive compulsive disorder, and panic attacks. It can also treat certain eating disorders.  How should I use this medicine?  Take this medicine by mouth with a glass of water. Follow the directions on the prescription label. You can take this medicine with or without food. Take your medicine at regular intervals. Do not take it more often than directed. Do not stop taking this medicine suddenly except upon the advice of your doctor. Stopping this medicine too quickly may cause serious side effects or your condition may worsen.  A special MedGuide will be given to you by the pharmacist with each prescription and refill. Be sure to read this information carefully each time.  Talk to your pediatrician regarding the use of this medicine in children. While this drug may be prescribed for children as young as 7 years for selected conditions, precautions do apply.  What side effects may I notice from receiving this medicine?  Side effects that you should report to your doctor or health care professional as soon as possible:    allergic reactions like skin rash, itching or hives, swelling of the face, lips, or tongue    anxious    black, tarry stools    breathing problems    changes in vision    confusion    elevated mood, decreased need for sleep, racing thoughts, impulsive behavior    eye pain    fast, irregular  heartbeat    feeling faint or lightheaded, falls    feeling agitated, angry, or irritable    hallucination, loss of contact with reality    loss of balance or coordination    loss of memory    painful or prolonged erections    restlessness, pacing, inability to keep still    seizures    stiff muscles    suicidal thoughts or other mood changes    trouble sleeping    unusual bleeding or bruising    unusually weak or tired    vomiting  Side effects that usually do not require medical attention (report to your doctor or health care professional if they continue or are bothersome):    change in appetite or weight    change in sex drive or performance    diarrhea    dry mouth    headache    increased sweating    nausea    tremors  What may interact with this medicine?  Do not take this medicine with any of the following medications:    other medicines containing fluoxetine, like Sarafem or Symbyax    cisapride    linezolid    MAOIs like Carbex, Eldepryl, Marplan, Nardil, and Parnate    methylene blue (injected into a vein)    pimozide    thioridazine  This medicine may also interact with the following medications:    alcohol    amphetamines    aspirin and aspirin-like medicines    carbamazepine    certain medicines for depression, anxiety, or psychotic disturbances    certain medicines for migraine headaches like almotriptan, eletriptan, frovatriptan, naratriptan, rizatriptan, sumatriptan, zolmitriptan    digoxin    diuretics    fentanyl    flecainide    furazolidone    isoniazid    lithium    medicines for sleep    medicines that treat or prevent blood clots like warfarin, enoxaparin, and dalteparin    NSAIDs, medicines for pain and inflammation, like ibuprofen or naproxen    phenytoin    procarbazine    propafenone    rasagiline    ritonavir    supplements like Christelle's wort, kava kava, valerian    tramadol    tryptophan    vinblastine  What if I miss a dose?  If you miss a dose, skip the missed dose and go back to  your regular dosing schedule. Do not take double or extra doses.  Where should I keep my medicine?  Keep out of the reach of children.  Store at room temperature between 15 and 30 degrees C (59 and 86 degrees F). Throw away any unused medicine after the expiration date.  What should I tell my health care provider before I take this medicine?  They need to know if you have any of these conditions:    bipolar disorder or a family history of bipolar disorder    bleeding disorders    glaucoma    heart disease    liver disease    low levels of sodium in the blood    seizures    suicidal thoughts, plans, or attempt; a previous suicide attempt by you or a family member    take MAOIs like Carbex, Eldepryl, Marplan, Nardil, and Parnate    take medicines that treat or prevent blood clots    thyroid disease    an unusual or allergic reaction to fluoxetine, other medicines, foods, dyes, or preservatives    pregnant or trying to get pregnant    breast-feeding  What should I watch for while using this medicine?  Tell your doctor if your symptoms do not get better or if they get worse. Visit your doctor or health care professional for regular checks on your progress. Because it may take several weeks to see the full effects of this medicine, it is important to continue your treatment as prescribed by your doctor.  Patients and their families should watch out for new or worsening thoughts of suicide or depression. Also watch out for sudden changes in feelings such as feeling anxious, agitated, panicky, irritable, hostile, aggressive, impulsive, severely restless, overly excited and hyperactive, or not being able to sleep. If this happens, especially at the beginning of treatment or after a change in dose, call your health care professional.  You may get drowsy or dizzy. Do not drive, use machinery, or do anything that needs mental alertness until you know how this medicine affects you. Do not stand or sit up quickly, especially if  you are an older patient. This reduces the risk of dizzy or fainting spells. Alcohol may interfere with the effect of this medicine. Avoid alcoholic drinks.  Your mouth may get dry. Chewing sugarless gum or sucking hard candy, and drinking plenty of water may help. Contact your doctor if the problem does not go away or is severe.  This medicine may affect blood sugar levels. If you have diabetes, check with your doctor or health care professional before you change your diet or the dose of your diabetic medicine.  NOTE:This sheet is a summary. It may not cover all possible information. If you have questions about this medicine, talk to your doctor, pharmacist, or health care provider. Copyright  2019 [employer]

## 2020-05-20 NOTE — PROGRESS NOTES
Mailing AVS to patient's address listed in the chart. This will go out in tomorrow's mail.  Jyoti [facility]  2nd Floor  Primary Care

## 2020-05-21 ASSESSMENT — ANXIETY QUESTIONNAIRES: GAD7 TOTAL SCORE: 2

## 2020-05-31 DIAGNOSIS — N94.6 DYSMENORRHEA: ICD-10-CM

## 2020-06-03 RX ORDER — NORETHINDRONE AND ETHINYL ESTRADIOL 1 MG-35MCG
KIT ORAL
Qty: 84 TABLET | Refills: 0 | Status: SHIPPED | OUTPATIENT
Start: 2020-06-03 | End: 2020-08-05

## 2020-06-03 NOTE — TELEPHONE ENCOUNTER
Routing refill request to provider for review/approval because:    Hormone Replacement Therapy [id number]/31/2020 09:10 AM   Blood pressure under 140/90 in past 12 months     Cary Rubio RN, [facility] Triage

## 2020-06-10 ENCOUNTER — VIRTUAL VISIT (OUTPATIENT)
Dept: PSYCHOLOGY | Facility: CLINIC | Age: 20
End: 2020-06-10
Payer: COMMERCIAL

## 2020-06-10 DIAGNOSIS — F41.1 GENERALIZED ANXIETY DISORDER: Primary | ICD-10-CM

## 2020-06-10 PROCEDURE — 90791 PSYCH DIAGNOSTIC EVALUATION: CPT | Mod: 95 | Performed by: COUNSELOR

## 2020-06-10 ASSESSMENT — COLUMBIA-SUICIDE SEVERITY RATING SCALE - C-SSRS
1. IN THE PAST MONTH, HAVE YOU WISHED YOU WERE DEAD OR WISHED YOU COULD GO TO SLEEP AND NOT WAKE UP?: NO
ATTEMPT PAST THREE MONTHS: NO
TOTAL  NUMBER OF INTERRUPTED ATTEMPTS PAST 3 MONTHS: NO
4. HAVE YOU HAD THESE THOUGHTS AND HAD SOME INTENTION OF ACTING ON THEM?: NO
5. HAVE YOU STARTED TO WORK OUT OR WORKED OUT THE DETAILS OF HOW TO KILL YOURSELF? DO YOU INTEND TO CARRY OUT THIS PLAN?: NO
2. HAVE YOU ACTUALLY HAD ANY THOUGHTS OF KILLING YOURSELF LIFETIME?: NO
TOTAL  NUMBER OF INTERRUPTED ATTEMPTS LIFETIME: NO
4. HAVE YOU HAD THESE THOUGHTS AND HAD SOME INTENTION OF ACTING ON THEM?: NO
6. HAVE YOU EVER DONE ANYTHING, STARTED TO DO ANYTHING, OR PREPARED TO DO ANYTHING TO END YOUR LIFE?: NO
2. HAVE YOU ACTUALLY HAD ANY THOUGHTS OF KILLING YOURSELF?: NO
1. IN THE PAST MONTH, HAVE YOU WISHED YOU WERE DEAD OR WISHED YOU COULD GO TO SLEEP AND NOT WAKE UP?: NO
6. HAVE YOU EVER DONE ANYTHING, STARTED TO DO ANYTHING, OR PREPARED TO DO ANYTHING TO END YOUR LIFE?: NO
TOTAL  NUMBER OF ABORTED OR SELF INTERRUPTED ATTEMPTS PAST 3 MONTHS: NO
TOTAL  NUMBER OF ABORTED OR SELF INTERRUPTED ATTEMPTS PAST LIFETIME: NO
3. HAVE YOU BEEN THINKING ABOUT HOW YOU MIGHT KILL YOURSELF?: NO
ATTEMPT LIFETIME: NO
5. HAVE YOU STARTED TO WORK OUT OR WORKED OUT THE DETAILS OF HOW TO KILL YOURSELF? DO YOU INTEND TO CARRY OUT THIS PLAN?: NO

## 2020-06-10 NOTE — PROGRESS NOTES
"[facility]  Evaluator Name:  Sarah Elliott     Credentials:  [facility]    PATIENT'S NAME: Ana Joshi  PREFERRED NAME: Ana  PREFERRED PRONOUNS:  she/her     MRN:   [id number]  :   2000   ACCT. NUMBER: [id number]  DATE OF SERVICE: 6/10/20  START TIME: 12:08pm  END TIME: 12:55pm  PREFERRED PHONE: [phone]  May we leave a program related message: Yes    STANDARD ADULT DIAGNOSTIC ASSESSMENT    The patient has been notified of the following:      \"We have found that certain health care needs can be provided without the need for a face to face visit.  This service lets us provide the care you need with a phone conversation.       I will have full access to your [place] medical record during this entire phone call.   I will be taking notes for your medical record.      Since this is like an office visit, we will bill your insurance company for this service.       There are potential benefits and risks of telephone visits (e.g. limits to patient confidentiality) that differ from in-person visits.?  Confidentiality still applies for telephone services, and nobody will record the visit.  It is important to be in a quiet, private space that is free of distractions (including cell phone or other devices) during the visit.??      If during the course of the call I believe a telephone visit is not appropriate, you will not be charged for this service\"     Consent has been obtained for this service by care team member: Yes     Identifying Information:  Patient is a 19 year old, .  The pronoun use throughout this assessment reflects the patient's chosen pronoun.  Patient was referred for an assessment by self.  Patient attended the session alone.     Chief Complaint:   The reason for seeking services at this time is: \" increasing anxiety and some depression \"   The problem(s) began when college started last year. Patient has attempted to resolve these concerns in the past through " talking with support systems, medication.    Does the client have any condition that is currently presenting as a potential to harm themselves or others (severe withdrawal, serious medical condition, severe emotional/behavioral problem)? No.  Proceed with assessment.    Social/Family History:  Patient reported they grew up in [place], [place].  They were raised by biological parents.   Parents are still living and  to one another.   Patient reported that  her   childhood was good and well supported.  Patient described their current relationships with family of origin as close.      The patient describes their cultural background as .  Cultural influences and impact on patient's life structure, values, norms, and healthcare: Spiritual Beliefs: spiritual, but not incorporated into counseling.  Contextual influences on patient's health include: Individual Factors looking into becoming a therapist herself.    These factors will be addressed in the Preliminary Treatment plan.  Patient identified their preferred language to be English. Patient reported they does not need the assistance of an  or other support involved in therapy.     Patient reported had no significant delays in developmental tasks.   Patient's highest education level was some college. Patient identified the following learning problems: none reported.  Modifications will not be used to assist communication in therapy.   Patient reports they are  able to understand written materials.    Patient reported the following relationship history 1 serious relationship.  Patient's current relationship status is in a relationship  for a year and five months.   Patient identified their sexual orientation as heterosexual.  Patient reported having zero child(louis).     Patient's current living/housing situation involves staying with parents currently, but usually lives on campus at [employer].  They live with mother and father and  they report that housing is not stable. Patient identified parents, friends and co-worker as part of their support system.  Patient identified the quality of these relationships as good.      Patient is currently employed part time and reports they are able to function appropriately at work. and a student and reports they are able to function appropriately at school..  Patient reports their finances are obtained through employment and parents.  Patient does not identify finances as a current stressor.      Patient reported that they have not been involved with the legal system.   Patient denies being on probation / parole / under the jurisdiction of the court.        Patient's Strengths and Limitations:  Patient identified the following strengths or resources that will help them succeed in treatment: Samaritan / Buddhist, friends / good social support, family support, intelligence, positive school connection, positive work environment, strong social skills and work ethic. Things that may interfere with the patient's success in treatment include: moving back to [place] in the fall.   _______________________________________________  Personal and Family Medical History:   Patient did report a family history of mental health concerns.  Patient reports family history includes Allergies in her father and mother; Anxiety Disorder in her mother; Asthma in her father; Cancer in her maternal grandfather and maternal grandmother; Cerebrovascular Disease in her maternal grandfather; Heart Disease (age of onset: 1) in her maternal uncle; Hypertension in her maternal grandfather; Other Cancer in her maternal grandfather and maternal grandmother; Thyroid Disease in her maternal grandmother; Unknown/Adopted in her father..     Patient reported the following previous diagnoses which include(s): an Anxiety Disorder.  Patient reported symptoms began 2 years ago.   Patient has received mental health services in the past: primary care  provider at [facility]..  Psychiatric Hospitalizations: None.  Patient denies a history of civil commitment.  Currently, patient is not receiving other mental health services.  These include medication.   Patient has had a physical exam to rule out medical causes for current symptoms.  Date of last physical exam was within the past year. Client was encouraged to follow up with PCP if symptoms were to develop. The patient has a [facility] Primary Care Provider, who is named Gladys Elliott.  Patient reports no current medical concerns.  There are not significant appetite / nutritional concerns / weight changes.   Patient does not report a history of head injury / trauma / cognitive impairment.      Patient reports current meds as:   Outpatient Medications Marked as Taking for the 6/10/20 encounter (Virtual Visit) with Sydnie Elliott LPC   Medication Sig     ALAYCEN 1/35 1-35 MG-MCG tablet TAKE 1 TABLET BY MOUTH EVERY DAY     FLUoxetine (PROZAC) 20 MG capsule Take 1 capsule (20 mg) by mouth daily       Medication Adherence:  Patient reports taking prescribed medications as prescribed.    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          Current Mental Status Exam:   Appearance:  Could not assess, phone session  Eye Contact:  Could not assess, phone session   Psychomotor:  Could not assess, phone session       Gait / station:  Could not assess, phone session  Attitude / Demeanor: Could not assess, phone session   Speech      Rate / Production: Normal/ Responsive      Volume:  Normal  volume      Language:  intact  Mood:   Euthymic  Affect:   Appropriate    Thought Content: Clear   Thought Process: Coherent  Logical       Associations: No loosening of associations  Insight:   Good   Judgment:  Intact    Orientation:  All  Attention/concentration: Good    Rating Scales:    PHQ9:    PHQ-9 SCORE 4/24/2019 2/7/2020 5/20/2020   PHQ-9 Total Score 9 4 3   ;    GAD7:    DAGO-7 SCORE 4/24/2019 2/7/2020 5/20/2020   Total Score 9 6 2     CGI:     First:Considering your total clinical experience with this particular patient population, how severe are the patient's symptoms at this time?: 3 (6/10/2020 12:26 PM)  ;    Most recentCompared to the patient's condition at the START of treatment, this patient's condition is: 4 (6/10/2020 12:26 PM)      Substance Use:  Patient did not report a family history of substance use concerns; see medical history section for details.  Patient has not received chemical dependency treatment in the past.  Patient has not ever been to detox.      Patient is not currently receiving any chemical dependency treatment. Patient reported the following problems as a result of their substance use: none.    Patient reports using alcohol 1 times per month and has 1 beers at a time. Patient first started drinking at age 17.  Patient reported date of last use was back in college.  Patient reports heaviest use was freshman year.  Patient denies using tobacco.  Patient denies using marijuana.  Patient reports using caffeine 2 times per day and drinks 1 at a time. Patient started using caffeine at age teen.  Patient reports using/abusing the following substance(s). Patient reported no other substance use.     CAGE- AID:  No flowsheet data found.    Substance Use: No symptoms    Based on the negative CAGE score and clinical interview there  are not indications of drug or alcohol abuse.      Significant Losses / Trauma / Abuse / Neglect Issues:   Patient did not serve in the .  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 Watched person take his last breaths.  Concerns for possible neglect are not present.     Safety  Assessment:   Current Safety Concerns:  Bigelow Suicide Severity Rating Scale (Lifetime/Recent)  Bigelow Suicide Severity Rating (Lifetime/Recent) 6/10/2020   1. Wish to be Dead (Lifetime) No   1. Wish to be Dead (Recent) No   2. Non-Specific Active Suicidal Thoughts (Lifetime) No   2. Non-Specific Active Suicidal Thoughts (Recent) No   3. Active Suicidal Ideation with any Methods (Not Plan) Without Intent to Act (Lifetime) No   3. Active Sucidal Ideation with any Methods (Not Plan) Without Intent to Act (Recent) No   4. Active Suicidal Ideation with Some Intent to Act, Without Specific Plan (Lifetime) No   4. Active Suicidal Ideation with Some Intent to Act, Without Specific Plan (Recent) No   5. Active Suicidal Ideation with Specific Plan and Intent (Lifetime) No   5. Active Suicidal Ideation with Specific Plan and Intent (Recent) No   Most Severe Ideation Rating (Lifetime) NA   Frequency (Lifetime) NA   Duration (Lifetime) NA   Controllability (Lifetime) NA   Protective Factors  (Lifetime) NA   Reasons for Ideation (Lifetime) NA   Most Severe Ideation Rating (Past Month) NA   Frequency (Past Month) NA   Duration (Past Month) NA   Controllability (Past Month) NA   Protective Factors (Past Month) NA   Reasons for Ideation (Past Month) NA   Actual Attempt (Lifetime) No   Actual Attempt (Past 3 Months) No   Has subject engaged in non-suicidal self-injurious behavior? (Lifetime) No   Has subject engaged in non-suicidal self-injurious behavior? (Past 3 Months) No   Interrupted Attempts (Lifetime) No   Interrupted Attempts (Past 3 Months) No   Aborted or Self-Interrupted Attempt (Lifetime) No   Aborted or Self-Interrupted Attempt (Past 3 Months) No   Preparatory Acts or Behavior (Lifetime) No   Preparatory Acts or Behavior (Past 3 Months) No   Most Recent Attempt Actual Lethality Code NA   Most Lethal Attempt Actual Lethality Code NA   Initial/First Attempt Actual Lethality Code NA     Patient denies current homicidal  ideation and behaviors.  Patient denies current self-injurious ideation and behaviors.    Patient denied risk behaviors associated with substance use.  Patient denies any high risk behaviors associated with mental health symptoms.  Patient reports the following current concerns for their personal safety: None.  Patient reports there are firearms in the house. The firearms are secured in a locked space.     History of Safety Concerns:  Patient denied a history of homicidal ideation.     Patient denied a history of personal safety concerns.    Patient denied a history of assaultive behaviors.    Patient denied a history of assaultive behaviors.     Patient denied a history of risk behaviors associated with substance use.  Patient denies any history of high risk behaviors associated with mental health symptoms.  Patient reports the following protective factors: spirituality, positive relationships positive social network, sober network and positive family connections, forward/future oriented thinking, dedication to family/friends, safe and stable environment, effectively controls impulses, adherence with prescribed medication, living with other people, committment to well-being, positive social skills and access to a variety of clinical interventions    Risk Plan:  See Preliminary Treatment Plan for Safety and Risk Management Plan    Review of Symptoms per patient report:  Depression: No symptoms  Angie:  No Symptoms  Psychosis: No Symptoms  Anxiety: Excessive worry, Nervousness, Social anxiety, Psychomotor agitation, Ruminations, Poor concentration and Irritability  Panic:  Palpitations, Tremors and Shortness of breath  Post Traumatic Stress Disorder:  No Symptoms   Eating Disorder: No Symptoms  ADD / ADHD:  No symptoms  Conduct Disorder: No symptoms  Autism Spectrum Disorder: No symptoms  Obsessive Compulsive Disorder: No Symptoms    Patient reports the following compulsive behaviors and treatment history: none.       Diagnostic Criteria:   A. Excessive anxiety and worry about a number of events or activities (such as work or school performance).   B. The person finds it difficult to control the worry.  C. Select 3 or more symptoms (required for diagnosis). Only one item is required in children.   - Restlessness or feeling keyed up or on edge.    - Difficulty concentrating or mind going blank.    - Irritability.    - Muscle tension.    - Sleep disturbance (difficulty falling or staying asleep, or restless unsatisfying sleep).   D. The focus of the anxiety and worry is not confined to features of an Axis I disorder.  E. The anxiety, worry, or physical symptoms cause clinically significant distress or impairment in social, occupational, or other important areas of functioning.   F. The disturbance is not due to the direct physiological effects of a substance (e.g., a drug of abuse, a medication) or a general medical condition (e.g., hyperthyroidism) and does not occur exclusively during a Mood Disorder, a Psychotic Disorder, or a Pervasive Developmental Disorder.    - The aformentioned symptoms began 2 year(s) ago and occurs 3 days per week and is experienced as mild.    Functional Status:  Patient reports the following functional impairments: academic performance, home life with parents at times, management of the household and or completion of tasks, relationship(s), self-care and social interactions.       Clinical Summary:  1. Reason for assessment: increasing anxiety symptoms  .  2. Psychosocial, Cultural and Contextual Factors: Amish but not sure she wants it incorporated into therapy  .  3. As evidenced by self report and criteria, client meets the following DSM5 Diagnoses:   (Sustained by DSM5 Criteria Listed Above)  300.02 (F41.1) Generalized Anxiety Disorder.  Other Diagnoses that is relevant to services: none  4. R/O: 311 (F32.9) Unspecified Depressive Disorder  due to some  .   5. Provisional Diagnosis:  300.02  (F41.1) Generalized Anxiety Disorder as evidenced by history of symptoms .  6. Prognosis: Expect Improvement.  7. Likely consequences of symptoms if not treated: continue functioning at this level.  8. Client strengths include:  caring, creative, educated, empathetic, employed, goal-focused, good listener, insightful, intelligent, motivated, open to learning, open to suggestions / feedback, support of family, friends and providers, wants to learn, willing to ask questions, willing to relate to others and work history .     Recommendations:     1. Plan for Safety and Risk Management:Recommended that patient call 911 or go to the local ED should there be a change in any of these risk factors..  Report to child / adult protection services was NA.     2. Patient's identified felicia / Sabianist / spiritual influences will be incorporated into care by incorporating as client requests.     3. Initial Treatment will focus on: Anxiety - decrease anxiety symptoms.     4. Resources/Service Plan:       services are not indicated.     Modifications to assist communication are not indicated.     Additional disability accommodations are not indicated.      5. Collaboration:  Collaboration / coordination of treatment will be initiated with the following support professionals: none.      6.  Referrals:  The following referral(s) will be initiated: Outpatient Mental [facility]. Next Scheduled Appointment: next week.  A Release of Information has been obtained for the following: none.    7. GRICELDA: GRICELDA:  Discussed the general effects of drugs and alcohol on health and well-being. Provider gave patient printed information about the effects of chemical use on their health and well being. Recommendations:  none .     8. Records were reviewed at time of assessment.  Information in this assessment was obtained from the medical record and provided by patient who is a good historian.   Patient will have open access to their mental  health medical record.      Eval type:  Mental Health    Staff Name/Credentials:  Sarah Elliott PsyD, [facility]  Joyce 10, 2020

## 2020-06-10 NOTE — Clinical Note
Good afternoon Gladys,  I met with Ana today for an intake. She and I have a lot in common, so I think it will be a great fit for her. Thank you for the referral. Please let me know if you have any questions.    Thanks  Sarah

## 2020-06-17 ENCOUNTER — VIRTUAL VISIT (OUTPATIENT)
Dept: PSYCHOLOGY | Facility: CLINIC | Age: 20
End: 2020-06-17
Payer: COMMERCIAL

## 2020-06-17 DIAGNOSIS — F41.1 GENERALIZED ANXIETY DISORDER: Primary | ICD-10-CM

## 2020-06-17 PROCEDURE — 90834 PSYTX W PT 45 MINUTES: CPT | Mod: 95 | Performed by: COUNSELOR

## 2020-06-18 NOTE — PROGRESS NOTES
Progress Note    Patient Name: Ana Joshi  Date: 6/17/2020         Service Type: Individual      Session Start Time: 9:00am  Session End Time: 9:50am     Session Length: 50 minutes    Session #: 2    Attendees: Client attended alone    Telemedicine Visit: The patient's condition can be safely assessed and treated via synchronous audio and visual telemedicine encounter.      Reason for Telemedicine Visit: Services only offered telehealth    Originating Site (Patient Location): Patient's home    Distant Site (Provider Location): Provider Remote Setting     Mode of Communication:  Video Conference via [employer]    As the provider I attest to compliance with applicable laws and regulations related to telemedicine.    Consent:  The patient/guardian has verbally consented to: the potential risks and benefits of telemedicine (video visit) versus in person care; bill my insurance or make self-payment for services provided; and responsibility for payment of non-covered services.      Treatment Plan Last Reviewed:   PHQ-9 / DAGO-7 :     DATA  Interactive Complexity: No  Crisis: No       Progress Since Last Session (Related to Symptoms / Goals / Homework):   Symptoms: No change moved items back into apartment in [place] with help from mother and boyfriend, caused argument between her and mother which increased anxiety    Homework: Did not complete      Episode of Care Goals: No improvement - PREPARATION (Decided to change - considering how); Intervened by negotiating a change plan and determining options / strategies for behavior change, identifying triggers, exploring social supports, and working towards setting a date to begin behavior change     Current / Ongoing Stressors and Concerns:   Moved back to school apartment. Forgot certain items needed to build her futon, which caused a fight between herself and her mother. Thoughts and feelings about being home in apartment  and moving from parents.     Treatment Objective(s) Addressed in This Session:   track and record at least 2 pleasant exchanges with mother  Building rapport     Intervention:   Interpersonal Therapy: role playing conversation between herself and her mother and how to best work on resolving conflicts. Education on different communication patterns and how to have more effective and assertive communication with others        ASSESSMENT: Current Emotional / Mental Status (status of significant symptoms):   Risk status (Self / Other harm or suicidal ideation)   Patient denies current fears or concerns for personal safety.   Patient denies current or recent suicidal ideation or behaviors.   Patient denies current or recent homicidal ideation or behaviors.   Patient denies current or recent self injurious behavior or ideation.   Patient denies other safety concerns.   Patient reports there has been no change in risk factors since their last session.     Patient reports there has been no change in protective factors since their last session.     Recommended that patient call 911 or go to the local ED should there be a change in any of these risk factors.     Appearance:   Appropriate    Eye Contact:   Fair    Psychomotor Behavior: Normal    Attitude:   Cooperative    Orientation:   All   Speech    Rate / Production: Normal/ Responsive Normal     Volume:  Normal    Mood:    Euthymic   Affect:    Appropriate    Thought Content:  Clear    Thought Form:  Coherent  Logical    Insight:    Fair      Medication Review:   No changes to current psychiatric medication(s)     Medication Compliance:   Yes     Changes in Health Issues:   None reported     Chemical Use Review:   Substance Use: Chemical use reviewed, no active concerns identified      Tobacco Use: No current tobacco use.      Diagnosis:  1. Generalized anxiety disorder        Collateral Reports Completed:   Not Applicable    PLAN: (Patient Tasks / Therapist Tasks /  Other)  Continue with individual therapy. Homework to think of treatment goals for next session.        Sydnie Elliott, LPC  June 18, 2020

## 2020-07-01 NOTE — TELEPHONE ENCOUNTER
This writer attempted to contact Ana on 07/01/20    Reason for call Rx approved for one time, appt due and left message to return call.    When patient calls back, please schedule appointment next available with PCP .        Manuel Fang

## 2020-07-02 ENCOUNTER — VIRTUAL VISIT (OUTPATIENT)
Dept: PSYCHOLOGY | Facility: CLINIC | Age: 20
End: 2020-07-02
Payer: COMMERCIAL

## 2020-07-02 DIAGNOSIS — F41.1 GENERALIZED ANXIETY DISORDER: Primary | ICD-10-CM

## 2020-07-02 PROCEDURE — 90834 PSYTX W PT 45 MINUTES: CPT | Mod: 95 | Performed by: COUNSELOR

## 2020-07-02 NOTE — TELEPHONE ENCOUNTER
Called and scheduled patient for a physical on 8/5/2020 at 7 am. Provider completely booked for face to face visit in July. Patient needs a refill until appt.  Jyoti Bennett [facility]  2nd Floor  Primary Care

## 2020-07-14 NOTE — PROGRESS NOTES
"                                             Progress Note    Patient Name: Ana Joshi  Date: 6/17/27/2020         Service Type: Individual      Session Start Time: 9:00am  Session End Time: 9:50am     Session Length: 50 minutes    Session #: 3    Attendees: Client attended alone    The patient has been notified of the following:      \"We have found that certain health care needs can be provided without the need for a face to face visit.  This service lets us provide the care you need with a phone conversation.       I will have full access to your [facility] medical record during this entire phone call.   I will be taking notes for your medical record.      Since this is like an office visit, we will bill your insurance company for this service.       There are potential benefits and risks of telephone visits (e.g. limits to patient confidentiality) that differ from in-person visits.?  Confidentiality still applies for telephone services, and nobody will record the visit.  It is important to be in a quiet, private space that is free of distractions (including cell phone or other devices) during the visit.??      If during the course of the call I believe a telephone visit is not appropriate, you will not be charged for this service\"     Consent has been obtained for this service by care team member: Yes      Treatment Plan Last Reviewed:   PHQ-9 / DAGO-7 :     DATA  Interactive Complexity: No  Crisis: No       Progress Since Last Session (Related to Symptoms / Goals / Homework):   Symptoms: Improving adjusting to being back in her apartment. Thinking through school choices    Homework: Did not complete      Episode of Care Goals: No improvement - PREPARATION (Decided to change - considering how); Intervened by negotiating a change plan and determining options / strategies for behavior change, identifying triggers, exploring social supports, and working towards setting a date to begin behavior " change     Current / Ongoing Stressors and Concerns:   Moved back to school apartment. Forgot certain items needed to build her futon, which caused a fight between herself and her mother. Thoughts and feelings about being home in apartment and moving from parents.     Treatment Objective(s) Addressed in This Session:   identify 2 fears / thoughts that contribute to feeling anxious     Intervention:   CBT: Thinking through different school options for what she wants to major in and thinking through what she really wants. Normalizing a lot of college students feel that same way.    Situation   Unsure what she wants to major in for sure for school. Debating nursing versus psychology.     Automatic Thoughts  Cognitive Distortions   Exploring which option pays better or has more career potential long term.   Feelings   Anxiety and curiosity     Behavior Creating list of what she wants for a job and what jobs have those quality   Questioning Thoughts   Will I be able to support myself career/financially with these careers.               ASSESSMENT: Current Emotional / Mental Status (status of significant symptoms):   Risk status (Self / Other harm or suicidal ideation)   Patient denies current fears or concerns for personal safety.   Patient denies current or recent suicidal ideation or behaviors.   Patient denies current or recent homicidal ideation or behaviors.   Patient denies current or recent self injurious behavior or ideation.   Patient denies other safety concerns.   Patient reports there has been no change in risk factors since their last session.     Patient reports there has been no change in protective factors since their last session.     Recommended that patient call 911 or go to the local ED should there be a change in any of these risk factors.     Appearance:   could not assess, phone session    Eye Contact:   could not assess, phone session    Psychomotor Behavior: could not assess, phone session     Attitude:   Cooperative    Orientation:   All   Speech    Rate / Production: Normal/ Responsive Normal     Volume:  Normal    Mood:    Euthymic   Affect:    Appropriate    Thought Content:  Clear    Thought Form:  Coherent  Logical    Insight:    Fair      Medication Review:   No changes to current psychiatric medication(s)     Medication Compliance:   Yes     Changes in Health Issues:   None reported     Chemical Use Review:   Substance Use: Chemical use reviewed, no active concerns identified      Tobacco Use: No current tobacco use.      Diagnosis:  1. Generalized anxiety disorder        Collateral Reports Completed:   Not Applicable    PLAN: (Patient Tasks / Therapist Tasks / Other)  Continue with individual therapy. Homework to think of treatment goals for next session.        Sydnie Elliott, LPC  July 14, 2020

## 2020-08-05 ENCOUNTER — OFFICE VISIT (OUTPATIENT)
Dept: FAMILY MEDICINE | Facility: CLINIC | Age: 20
End: 2020-08-05
Payer: COMMERCIAL

## 2020-08-05 VITALS
TEMPERATURE: 97 F | WEIGHT: 172 LBS | HEIGHT: 60 IN | RESPIRATION RATE: 18 BRPM | OXYGEN SATURATION: 98 % | DIASTOLIC BLOOD PRESSURE: 87 MMHG | HEART RATE: 68 BPM | SYSTOLIC BLOOD PRESSURE: 120 MMHG | BODY MASS INDEX: 33.77 KG/M2

## 2020-08-05 DIAGNOSIS — Z12.4 SCREENING FOR MALIGNANT NEOPLASM OF CERVIX: ICD-10-CM

## 2020-08-05 DIAGNOSIS — Z11.3 SCREENING FOR STDS (SEXUALLY TRANSMITTED DISEASES): ICD-10-CM

## 2020-08-05 DIAGNOSIS — Z30.41 ENCOUNTER FOR SURVEILLANCE OF CONTRACEPTIVE PILLS: ICD-10-CM

## 2020-08-05 DIAGNOSIS — Z00.00 ROUTINE GENERAL MEDICAL EXAMINATION AT A HEALTH CARE FACILITY: Primary | ICD-10-CM

## 2020-08-05 DIAGNOSIS — F32.1 MODERATE SINGLE CURRENT EPISODE OF MAJOR DEPRESSIVE DISORDER (H): ICD-10-CM

## 2020-08-05 DIAGNOSIS — E66.3 OVERWEIGHT WITH BODY MASS INDEX (BMI) OF 27 TO 27.9 IN ADULT: ICD-10-CM

## 2020-08-05 PROBLEM — E66.811 CLASS 1 OBESITY DUE TO EXCESS CALORIES WITH BODY MASS INDEX (BMI) OF 31.0 TO 31.9 IN ADULT, UNSPECIFIED WHETHER SERIOUS COMORBIDITY PRESENT: Status: RESOLVED | Noted: 2017-12-28 | Resolved: 2020-08-05

## 2020-08-05 PROBLEM — E66.09 CLASS 1 OBESITY DUE TO EXCESS CALORIES WITH BODY MASS INDEX (BMI) OF 31.0 TO 31.9 IN ADULT, UNSPECIFIED WHETHER SERIOUS COMORBIDITY PRESENT: Status: RESOLVED | Noted: 2017-12-28 | Resolved: 2020-08-05

## 2020-08-05 LAB — GLUCOSE SERPL-MCNC: 86 MG/DL (ref 70–99)

## 2020-08-05 PROCEDURE — 87591 N.GONORRHOEAE DNA AMP PROB: CPT | Performed by: NURSE PRACTITIONER

## 2020-08-05 PROCEDURE — G0145 SCR C/V CYTO,THINLAYER,RESCR: HCPCS | Performed by: NURSE PRACTITIONER

## 2020-08-05 PROCEDURE — 99395 PREV VISIT EST AGE 18-39: CPT | Performed by: NURSE PRACTITIONER

## 2020-08-05 PROCEDURE — 99214 OFFICE O/P EST MOD 30 MIN: CPT | Mod: 25 | Performed by: NURSE PRACTITIONER

## 2020-08-05 PROCEDURE — 36415 COLL VENOUS BLD VENIPUNCTURE: CPT | Performed by: NURSE PRACTITIONER

## 2020-08-05 PROCEDURE — 87491 CHLMYD TRACH DNA AMP PROBE: CPT | Performed by: NURSE PRACTITIONER

## 2020-08-05 PROCEDURE — 82947 ASSAY GLUCOSE BLOOD QUANT: CPT | Performed by: NURSE PRACTITIONER

## 2020-08-05 RX ORDER — NORETHINDRONE AND ETHINYL ESTRADIOL 1 MG-35MCG
1 KIT ORAL DAILY
Qty: 84 TABLET | Refills: 3 | Status: SHIPPED | OUTPATIENT
Start: 2020-08-05 | End: 2021-05-10

## 2020-08-05 RX ORDER — FLUOXETINE 40 MG/1
40 CAPSULE ORAL DAILY
Qty: 90 CAPSULE | Refills: 1 | Status: SHIPPED | OUTPATIENT
Start: 2020-08-05 | End: 2021-01-25

## 2020-08-05 ASSESSMENT — MIFFLIN-ST. JEOR: SCORE: 1466.69

## 2020-08-05 ASSESSMENT — PAIN SCALES - GENERAL: PAINLEVEL: NO PAIN (0)

## 2020-08-05 NOTE — PATIENT INSTRUCTIONS
At [facility], we strive to deliver an exceptional experience to you, every time we see you. If you receive a survey, please complete it as we do value your feedback.  If you have MyChart, you can expect to receive results automatically within 24 hours of their completion.  Your provider will send a note interpreting your results as well.   If you do not have MyChart, you should receive your results in about a week by mail.    Your care team:                            Family Medicine Internal Medicine   MD Brooks Pandya, MD Kristian Cortez MD Katya Georgiev PA-C Megan Hill, APRN CNP    Valdo Ventura, MD Pediatrics   Joaquim Aguilar, PASindyC  Elaine Beckwith, CNP MD Tila Saini APRN CNP   MD Lexii Coleman MD Deborah Mielke, MD Samantha Elliott, APRN CNP  Amelia Salas, PAJS Paredes, CNP  MD Lora Simpson MD Angela Wermerskirchen, MD      Clinic hours: Monday - Thursday 7 am-7 pm; Fridays 7 am-5 pm.   Urgent care: Monday - Friday 11 am-9 pm; Saturday and Sunday 9 am-5 pm.    Clinic: [phone]       [facility]: Monday - Thursday 8 am - 7 pm; Friday 8 am - 6 pm  [facility]: [phone]     Use www.oncare.org for 24/7 diagnosis and treatment of dozens of conditions.    Preventive Health Recommendations  Female Ages 21 to 25     Yearly exam:     See your health care provider every year in order to  o Review health changes.   o Discuss preventive care.    o Review your medicines if your doctor has prescribed any.      You should be tested each year for STDs (sexually transmitted diseases).       Talk to your provider about how often you should have cholesterol testing.      Get a Pap test every three years. If you have an abnormal result, your doctor may have you test more often.      If you are at risk for diabetes, you  should have a diabetes test (fasting glucose).     Shots:     Get a flu shot each year.     Get a tetanus shot every 10 years.     Consider getting the shot (vaccine) that prevents cervical cancer (Gardasil).    Nutrition:     Eat at least 5 servings of fruits and vegetables each day.    Eat whole-grain bread, whole-wheat pasta and brown rice instead of white grains and rice.    Get adequate Calcium and Vitamin D.     Lifestyle    Exercise at least 150 minutes a week each week (30 minutes a day, 5 days a week). This will help you control your weight and prevent disease.    Limit alcohol to one drink per day.    No smoking.     Wear sunscreen to prevent skin cancer.    See your dentist every six months for an exam and cleaning.    At [facility], we strive to deliver an exceptional experience to you, every time we see you. If you receive a survey, please complete it as we do value your feedback.  If you have MyChart, you can expect to receive results automatically within 24 hours of their completion.  Your provider will send a note interpreting your results as well.   If you do not have MyChart, you should receive your results in about a week by mail.    Your care team:                            Family Medicine Internal Medicine   MD Brooks Pandya MD Shantel Branch-Fleming, MD Srinivasa Vaka, MD Katya Georgiev PA-C  Leni Musa, APRN CNP    Valdo Ventura MD Pediatrics   Joaquim Aguilar, PA-C  Elaine Beckwith, MD Tila Winkler APRN CNP   MD Lexii Coleman MD Deborah Mielke, MD Kim Thein, APRN CNP  Amelia Salas, PA-C  Camila Paredes, CNP  MD Lora Simpson MD Angela Wermerskirchen, MD      Clinic hours: Monday - Thursday 7 am-7 pm; Fridays 7 am-5 pm.   Urgent care: Monday - Friday 11 am-9 pm; Saturday and Sunday 9 am-5 pm.    Clinic: [phone]       [facility]: Monday -  Thursday 8 am - 7 pm; Friday 8 am - 6 pm  [facility]: [phone]     Use www.oncQubulus.Gizmoz for 24/7 diagnosis and treatment of dozens of conditions.    Patient Education     Depression: Tips to Help Yourself    As your healthcare providers help treat your depression, you can also help yourself. Keep in mind that your illness affects you emotionally, physically, mentally, and socially. So full recovery will take time. Take care of your body and your soul, and be patient with yourself as you get better.  Self-care    Educate yourself. Read about treatment and medicine options. If you have the energy, attend local conferences or support groups. Keep a list of useful websites and helpful books and use them as needed. This illness is not your fault. Don t blame yourself for your depression.    Manage early symptoms. If you notice symptoms returning, experience triggers, or identify other factors that may lead to a depressive episode, get help as soon as possible. Ask trusted friends and family to monitor your behavior and let you know if they see anything of concern.    Work with your provider. Find a provider you can trust. Communicate honestly with that person and share information on your treatment for depression and your reaction to medicines.    Be prepared for a crisis. Know what to do if you experience a crisis. Keep the phone number of a crisis hotline and know the location of your community's urgent care centers and the closest emergency department.    Hold off on big decisions. Depression can cloud your judgment. So wait until you feel better before making major life decisions, such as changing jobs, moving, or getting  or .    Be patient. Recovering from depression is a process. Don t be discouraged if it takes some time to feel better.    Keep it simple. Depression saps your energy and concentration. So you won t be able to do all the things you used to do. Set  small goals and do what you can.    Be with others. Don t isolate yourself--you ll only feel worse. Try to be with other people. And take part in fun activities when you can. Go to a movie, ballgame, [employer] service, or social event. Talk openly with people you can trust. And accept help when it s offered.  Take care of your body  People with depression often lose the desire to take care of themselves. That only makes their problems worse. During treatment and afterward, make a point to:    Exercise. It s a great way to take care of your body. And studies have shown that exercise helps fight depression.    Avoid drugs and alcohol. These may ease the pain in the short term. But they ll only make your problems worse in the long run.    Get relief from stress. Ask your healthcare provider for relaxation exercises and techniques to help relieve stress.    Eat right. A balanced and healthy diet helps keep your body healthy.  Date Last Reviewed: 1/1/2017 2000-2019 [employer]. [address] [place] [address]. All rights reserved. This information is not intended as a substitute for professional medical care. Always follow your healthcare professional's instructions.           Patient Education     The Range of Pap Test Results  When your Pap test is sent to the lab, the lab studies your cell samples and reports any abnormal cell changes. Your healthcare provider can discuss these changes with you. In some cases, an abnormal Pap test is due to an infection. More serious cell changes range from dysplasia to cancer. Talk to your healthcare provider about your Pap test.    Normal results  Cervical cells, even normal ones, are always changing. As they mature, normal squamous cells move from deeper layers within the cervix. Over time, these cells flatten and cover the surface of the cervix. Within the cervical canal, the cells are different. These glandular cells are taller and not as flat as the  cells on the surface of the cervix. When a Pap test sample shows healthy cells of both types, the results are negative. Keep having Pap tests as often as directed.  Abnormal results  A positive Pap test result means some cells in the sample showed abnormal changes. These results are grouped by the type of cell change and the location, or extent, of the changes. Depending on the results, you may need further testing.    Inflammation. Noncancerous changes are present. They may be due to normal cell repair. Or, they may be caused by an infection, such as HPV or yeast. Further testing may be needed. (Also called reactive cellular changes.)    Atypical squamous cells. Test results are unclear. Cells on the surface of the cervix show changes, but their significance is not yet known. Testing for HPV and other sexually transmitted infections (STIs) may be needed. Treatment may be required. (Reported as ASC-US or ASC-H.)    Atypical glandular cells. Cells lining the cervical canal show abnormal changes. Further testing is likely. You may also have treatment to destroy or remove problem cells. (Reported as AGC.)    Mild dysplasia. Cells show distinct changes. More testing or HPV typing may be done. You may also have treatment to destroy or remove problem cells. (Reported as low-grade DAYNA or FEMI 1.)    Moderate to severe dysplasia. Cells show precancerous changes. Or, noninvasive cancer (carcinoma in situ) may be present. Treatment to destroy or remove problem cells is likely. (Reported as high-grade DAYNA or FEMI 2 or FEMI 3.)    Cancer. Different types of cancer may be detected by your Pap test. More tests to assess the cancer's extent are likely. The type of treatment will depend on the test results and other factors, such as age and health history. (Reported as squamous cell carcinoma, endocervical adenocarcinoma in situ, or adenocarcinoma.)  Date Last Reviewed: 8/1/2017 2000-2019 [employer]. [address] [place] [address]  [place] [address]. All rights reserved. This information is not intended as a substitute for professional medical care. Always follow your healthcare professional's instructions.           Patient Education     Prevention Guidelines, Women Ages 18 to 39  Screening tests and vaccines are an important part of managing your health. A screening test is done to find possible disorders or diseases in people who don't have any symptoms. The goal is to find a disease early so lifestyle changes can be made and you can be watched more closely to reduce the risk of disease, or to detect it early enough to treat it most effectively. Screening tests are not considered diagnostic, but are used to determine if more testing is needed. Health counseling is essential, too. Below are guidelines for these, for women ages 18 to 39. Talk with your healthcare provider to make sure you re up-to-date on what you need.  Screening Who needs it How often   Alcohol misuse All women in this age group At routine exams   Blood pressure All women in this age group Yearly checkup if your blood pressure is normal  Normal blood pressure is less than 120/80 mm Hg  If your blood pressure reading is higher than normal, follow the advice of your healthcare provider   Breast cancer All women in this age group should talk with their healthcare providers about the need for clinical breast exams (CBE)1 Clinical breast exam every 3 years1   Cervical cancer Women ages 21 and older Women between ages 21 and 29 should have a Pap test every 3 years; women between ages 30 and 65 are advised to have a Pap test plus an HPV test every 5 years   Chlamydia Sexually active women ages 25 and younger, and women at increased risk for infection (such as having multiple sex partners) Every year if you're at risk or have symptoms   Depression All women in this age group At routine exams   Type 2 diabetes, prediabetes All women with no symptoms who are overweight or  obese and have 1 or more other risk factors for diabetes At least every 3 years. Also, testing for diabetes during pregnancy after the 24th week.    Type 2 diabetes, prediabetes All women diagnosed with gestational diabetes Lifelong testing every 3 years   Type 2 diabetes All women with prediabetes Every year   Gonorrhea Sexually active women at increased risk for infection At routine exams   Hepatitis C Anyone at increased risk At routine exams   HIV All women should be tested at least once for HIV between the ages of 13 and 64 At routine exams. Those with risk factors for HIV should be tested at least annually.    Obesity All women in this age group At routine exams   Syphilis Women at increased risk for infection should talk with their healthcare provider At routine exams   Tuberculosis Women at increased risk for infection should talk with their healthcare provider Ask your healthcare provider   Vision All women in this age group At least 1 complete exam in your 20s, and 2 in your 30s   Vaccine2 Who needs it How often   Chickenpox (varicella) All women in this age group who have no record of this infection or vaccine 2 doses; the second dose should be given 4 to 8 weeks after the first dose   Hepatitis A Women at increased risk for infection should talk with their healthcare provider 2 doses given at least 6 months apart   Hepatitis B Women at increased risk for infection should talk with their healthcare provider 3 doses over 6 months; second dose should be given 1 month after the first dose; the third dose should be given at least 2 months after the second dose and at least 4 months after the first dose   Haemophilus influenzae Type B (HIB) Women at increased risk for infection should talk with their healthcare provider 1 to 3 doses   Human papillomavirus (HPV) All women in this age group up to age 26 3 doses; the second dose should be given 1 to 2 months after the first dose and the third dose given 6 months  after the first dose   Influenza (flu) All women in this age group Once a year   Measles, mumps, rubella (MMR) All women in this age group who have no record of these infections or vaccines 1 or 2 doses   Meningococcal Women at increased risk for infection should talk with their healthcare provider 1 or more doses   Pneumococcal conjugate vaccine (PCV13) and pneumococcal polysaccharide vaccine (PPSV23) Women at increased risk for infection should talk with their healthcare provider PCV13: 1 dose ages 19 to 65 (protects against 13 types of pneumococcal bacteria)  PPSV23: 1 to 2 doses through age 64, or 1 dose at 65 or older (protects against 23 types of pneumococcal bacteria)      Tetanus/diphtheria/pertussis (Td/Tdap) booster All women in this age group Td every 10 years, or a one-time dose of Tdap instead of a Td booster after age 18, then Td every 10 years   Counseling Who needs it How often   BRCA gene mutation testing for breast and ovarian cancer susceptibility Women with increased risk for having gene mutation When your risk is known   Breast cancer and chemoprevention Women at high risk for breast cancer When your risk is known   Diet and exercise Women who are overweight or obese When diagnosed, and then at routine exams   Domestic violence Women at the age in which they are able to have children At routine exams   Sexually transmitted infection prevention Women who are sexually active At routine exams   Skin cancer Prevention of skin cancer in fair-skinned adults At routine exams   Use of tobacco and the health effects it can cause All women in this age group Every visit   1 According to the ACS, women ages 20 to 39 years should have a clinical breast exam (CBE) as part of their routine health exam every 3 years. Breast self-exams are an option for women starting in their 20s. But the USPSTF does not recommend CBE.  Date Last Reviewed: 10/1/2017    2690-8781 [employer]. [address] [place] [address]  Road, [place]eter [address]. All rights reserved. This information is not intended as a substitute for professional medical care. Always follow your healthcare professional's instructions.

## 2020-08-05 NOTE — LETTER
August 10, 2020      Ana Joshi  [address] [facility] [place] [address]    Dear ,      I am happy to inform you that your recent cervical cancer screening test (PAP smear) was normal.      Preventative screenings such as this help to ensure your health for years to come. You should repeat a pap smear in 3 years, unless otherwise directed.      You will still need to return to the clinic every year for your annual exam and other preventive tests.     If you have additional questions regarding this result, please call our registered nurse, Angela at [phone].      Sincerely,      AYDEE Jimenez CNP/rlm

## 2020-08-05 NOTE — PROGRESS NOTES
SUBJECTIVE:   CC: Ana Joshi is an 20 year old woman who presents for preventive health visit.     Healthy Habits:    Do you get at least three servings of calcium containing foods daily (dairy, green leafy vegetables, etc.)? yes    Amount of exercise or daily activities, outside of work: 5  day(s) per week    Problems taking medications regularly No    Medication side effects: anxiety     Have you had an eye exam in the past two years? yes    Do you see a dentist twice per year? yes    Do you have sleep apnea, excessive snoring or daytime drowsiness?no      Depression and Anxiety Follow-Up    How are you doing with your depression since your last visit? Improved     How are you doing with your anxiety since your last visit?  Worsened -Dad had COVID-19 but she cared for him and worries about school, upcoming events.    Are you having other symptoms that might be associated with depression or anxiety? Yes:  Not sleeping well.    Have you had a significant life event? No     Do you have any concerns with your use of alcohol or other drugs? No    She also requests refill of COCP which is working well for her.  Dysmenorrhea has resolved and she has no issues with missing pills, no adverse effects. She is sexually active,1 partner., no history of STI.    Social History     Tobacco Use     Smoking status: Never Smoker     Smokeless tobacco: Never Used   Substance Use Topics     Alcohol use: No     Drug use: No     PHQ 4/24/2019 2/7/2020 5/20/2020   PHQ-9 Total Score 9 4 3   Q9: Thoughts of better off dead/self-harm past 2 weeks Not at all Not at all Not at all     DAGO-7 SCORE 4/24/2019 2/7/2020 5/20/2020   Total Score 9 6 2       Suicide Assessment Five-step Evaluation and Treatment (SAFE-T)      Today's PHQ-2 Score:   PHQ-2 ( 1999 Pf[employer]) 8/5/2020 5/31/2019   Q1: Little interest or pleasure in doing things 0 0   Q2: Feeling down, depressed or hopeless 0 0   PHQ-2 Score 0 0       Abuse: Current or  Past(Physical, Sexual or Emotional)- No  Do you feel safe in your environment? Yes        Social History     Tobacco Use     Smoking status: Never Smoker     Smokeless tobacco: Never Used   Substance Use Topics     Alcohol use: No     If you drink alcohol do you typically have >3 drinks per day or >7 drinks per week? No                     Reviewed orders with patient.  Reviewed health maintenance and updated orders accordingly - Yes  Labs reviewed in EPIC  BP Readings from Last 3 Encounters:   20 120/87   19 125/85   18 139/83    Wt Readings from Last 3 Encounters:   20 78 kg (172 lb)   20 64.9 kg (143 lb)   19 75 kg (165 lb 6.4 oz) (90 %, Z= 1.27)*     * Growth percentiles are based on [facility] (Girls, 2-20 Years) data.                  Patient Active Problem List   Diagnosis     Hyperhydrosis disorder     Acne     Infectious mononucleosis     Hx of excision of hemangioma     Snoring     Moderate single current episode of major depressive disorder (H)     Past Surgical History:   Procedure Laterality Date     TRACHEOSTOMY CHILD  birth to 1 yo     Trached until age 1 and lazer surgeries       Social History     Tobacco Use     Smoking status: Never Smoker     Smokeless tobacco: Never Used   Substance Use Topics     Alcohol use: No     Family History   Problem Relation Age of Onset     Allergies Mother         Penicillin, maybe seasonal     Anxiety Disorder Mother      Unknown/Adopted Father      Asthma Father      Allergies Father      Cancer Maternal Grandmother         Lung     Other Cancer Maternal Grandmother         bone     Thyroid Disease Maternal Grandmother      Cancer Maternal Grandfather         Lymphatic     Hypertension Maternal Grandfather      Cerebrovascular Disease Maternal Grandfather      Other Cancer Maternal Grandfather      Heart Disease Maternal Uncle 1         of CHF           Mammogram not appropriate for this patient based on age.    Pertinent mammograms  are reviewed under the imaging tab.  History of abnormal Pap smear: NO - age 21-29 PAP every 3 years recommended     Reviewed and updated as needed this visit by clinical staff  Tobacco  Allergies  Meds  Problems  Med Hx  Surg Hx  Fam Hx  Soc Hx          Reviewed and updated as needed this visit by Provider  Tobacco  Allergies  Meds  Problems  Med Hx  Surg Hx  Fam Hx        Past Medical History:   Diagnosis Date     Class 1 obesity due to excess calories with body mass index (BMI) of 31.0 to 31.9 in adult, unspecified whether serious comorbidity present 12/28/2017     Depressive disorder      Hemangioma 7/19/2011    Right arm     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       Past Surgical History:   Procedure Laterality Date     TRACHEOSTOMY CHILD  birth to 2 yo     Trached until age 1 and lazer surgeries       ROS:  CONSTITUTIONAL: NEGATIVE for fever, chills, change in weight  INTEGUMENTARU/SKIN: NEGATIVE for worrisome rashes, moles or lesions  EYES: NEGATIVE for vision changes or irritation  ENT: NEGATIVE for ear, mouth and throat problems  RESP: NEGATIVE for significant cough or SOB  BREAST: NEGATIVE for masses, tenderness or discharge  CV: NEGATIVE for chest pain, palpitations or peripheral edema  GI: NEGATIVE for nausea, abdominal pain, heartburn, or change in bowel habits  : NEGATIVE for unusual urinary or vaginal symptoms. Periods are regular.  MUSCULOSKELETAL: NEGATIVE for significant arthralgias or myalgia  NEURO: NEGATIVE for weakness, dizziness or paresthesias  ENDOCRINE: NEGATIVE for temperature intolerance, skin/hair changes  PSYCHIATRIC: POSITIVE for anxiety and insomnia , negative for SI/head injury.  Mood is improved on Prozac    OBJECTIVE:   /87 (BP Location: Right arm, Patient Position: Sitting, Cuff Size: Adult Large)   Pulse 68   Temp 97  F (36.1  C) (Tympanic)   Resp 18   Ht 1.524 m (5')   Wt 78 kg (172 lb)   SpO2 98%   Breastfeeding No   BMI 33.59  kg/m    EXAM:  GENERAL: healthy, alert and no distress  EYES: Eyes grossly normal to inspection, PERRL and conjunctivae and sclerae normal  HENT: ear canals and TM's normal, nose and mouth without ulcers or lesions  NECK: no adenopathy, no asymmetry, masses, or scars and thyroid normal to palpation  RESP: lungs clear to auscultation - no rales, rhonchi or wheezes  BREAST: normal without masses, tenderness or nipple discharge and no palpable axillary masses or adenopathy  CV: regular rate and rhythm, normal S1 S2, no S3 or S4, no murmur, click or rub, no peripheral edema and peripheral pulses strong  ABDOMEN: soft, nontender, no hepatosplenomegaly, no masses and bowel sounds normal   (female): normal female external genitalia, normal urethral meatus, vaginal mucosa pink, moist, well rugated, and normal cervix/adnexa/uterus without masses or discharge  MS: no gross musculoskeletal defects noted, no edema  SKIN: no suspicious lesions or rashes  NEURO: Normal strength and tone, mentation intact and speech normal  PSYCH: mentation appears normal, affect normal/bright  LYMPH: no cervical, supraclavicular, axillary, or inguinal adenopathy    Diagnostic Test Results:  Labs reviewed in Epic  No results found for this or any previous visit (from the past 24 hour(s)).    ASSESSMENT/PLAN:   1. Routine general medical examination at a health care facility    - Glucose    2. Screening for STDs (sexually transmitted diseases)  Always use condoms.  - NEISSERIA GONORRHOEA PCR  - CHLAMYDIA TRACHOMATIS PCR    3. Screening for malignant neoplasm of cervix    - Pap imaged thin layer screen only - recommended age 21 - 24 years    4. Moderate single current episode of major depressive disorder (H)  Increase Prozac to 40 mg daily, video visit in 1 month.    5. Overweight with body mass index (BMI) of 27 to 27.9 in adult  Benefits of weight loss reviewed in detail, encouraged her to cut back on the carbohydrates in the diet, consume more  fruits and vegetables, drink plenty of water, avoid fruit juices, sodas, get 150 min moderate exercise/week.  Recheck weight in 6 months.    6.  Encounter for surveillance of contraceptive pills  Doing well on COCP, reviewed missed pill regimine, follow back 1 year, sooner if concerns.    COUNSELING:   Reviewed preventive health counseling, as reflected in patient instructions    Estimated body mass index is 33.59 kg/m  as calculated from the following:    Height as of this encounter: 1.524 m (5').    Weight as of this encounter: 78 kg (172 lb).    Weight management plan: Discussed healthy diet and exercise guidelines     reports that she has never smoked. She has never used smokeless tobacco.      Counseling Resources:  ATP IV Guidelines  Pooled Cohorts Equation Calculator  Breast Cancer Risk Calculator  FRAX Risk Assessment  ICSI Preventive Guidelines  Dietary Guidelines for Americans, 2010  USDA's MyPlate  ASA Prophylaxis  Lung CA Screening    AYDEE Jimenez CNP  [facility]

## 2020-08-05 NOTE — LETTER
August 7, 2020      Ana Joshi  [address] [facility] [place] [address]            Dear ,    We are writing to inform you of your test results.    Your Gonorrhea and Chlamydia screens were both negative.  Continue to use condoms to help prevent sexually transmitted diseases.      Resulted Orders   NEISSERIA GONORRHOEA PCR   Result Value Ref Range    Specimen Descrip Endocervical     N Gonorrhea PCR Negative NEG^Negative      Comment:      Negative for N. gonorrhoeae rRNA by transcription mediated amplification.  A negative result by transcription mediated amplification does not preclude   the presence of N. gonorrhoeae infection because results are dependent on   proper and adequate collection, absence of inhibitors, and sufficient rRNA to   be detected.     CHLAMYDIA TRACHOMATIS PCR   Result Value Ref Range    Specimen Description Endocervical     Chlamydia Trachomatis PCR Negative NEG^Negative      Comment:      Negative for C. trachomatis rRNA by transcription mediated amplification.  A negative result by transcription mediated amplification does not preclude   the presence of C. trachomatis infection because results are dependent on   proper and adequate collection, absence of inhibitors, and sufficient rRNA to   be detected.     Glucose   Result Value Ref Range    Glucose 86 70 - 99 mg/dL      Comment:      Non Fasting     If you have any questions or concerns, please call the clinic at the number listed above.       Sincerely,    AYDEE Jimenez CNP

## 2020-08-07 LAB
COPATH REPORT: NORMAL
PAP: NORMAL

## 2020-08-13 ENCOUNTER — VIRTUAL VISIT (OUTPATIENT)
Dept: PSYCHOLOGY | Facility: CLINIC | Age: 20
End: 2020-08-13
Payer: COMMERCIAL

## 2020-08-13 DIAGNOSIS — F41.1 GENERALIZED ANXIETY DISORDER: Primary | ICD-10-CM

## 2020-08-13 PROCEDURE — 90834 PSYTX W PT 45 MINUTES: CPT | Mod: 95 | Performed by: COUNSELOR

## 2020-08-20 NOTE — PROGRESS NOTES
"                                             Progress Note    Patient Name: Ana Joshi  Date: 8/13/2020         Service Type: Individual      Session Start Time: 4:00pm  Session End Time: 4:50pm     Session Length: 50 minutes    Session #: 4    Attendees: Client attended alone    The patient has been notified of the following:      \"We have found that certain health care needs can be provided without the need for a face to face visit.  This service lets us provide the care you need with a phone conversation.       I will have full access to your [facility] medical record during this entire phone call.   I will be taking notes for your medical record.      Since this is like an office visit, we will bill your insurance company for this service.       There are potential benefits and risks of telephone visits (e.g. limits to patient confidentiality) that differ from in-person visits.?  Confidentiality still applies for telephone services, and nobody will record the visit.  It is important to be in a quiet, private space that is free of distractions (including cell phone or other devices) during the visit.??      If during the course of the call I believe a telephone visit is not appropriate, you will not be charged for this service\"     Consent has been obtained for this service by care team member: Yes      Treatment Plan Last Reviewed:   PHQ-9 / DAGO-7 :     DATA  Interactive Complexity: No  Crisis: No       Progress Since Last Session (Related to Symptoms / Goals / Homework):   Symptoms: Improving adjusting to being back in her apartment. Thinking through school choices    Homework: Did not complete      Episode of Care Goals: No improvement - PREPARATION (Decided to change - considering how); Intervened by negotiating a change plan and determining options / strategies for behavior change, identifying triggers, exploring social supports, and working towards setting a date to begin behavior change     Current " / Ongoing Stressors and Concerns:   Moved back to school apartment. Feels that her friends and boyfriend are at odds since her boyfriend is a  and her friends are very anti-police currently. Feels that it is difficult to manage relationships and keeping the peace with everyone.     Treatment Objective(s) Addressed in This Session:   identify 1 strategies to more effectively address stressors  track and record at least 2 pleasant exchanges with boyfriends and friends     Intervention:  Interpersonal Therapy: exploring communication skills with friends and finding ways to effectively communicate her feelings about the interactions they are having and the stress and depression she experiences as a result of the way they treat boyfriend. Also talking with boyfrienfranklin about ways that he can improve his interactions with her friends so it eases her stress.     ASSESSMENT: Current Emotional / Mental Status (status of significant symptoms):   Risk status (Self / Other harm or suicidal ideation)   Patient denies current fears or concerns for personal safety.   Patient denies current or recent suicidal ideation or behaviors.   Patient denies current or recent homicidal ideation or behaviors.   Patient denies current or recent self injurious behavior or ideation.   Patient denies other safety concerns.   Patient reports there has been no change in risk factors since their last session.     Patient reports there has been no change in protective factors since their last session.     Recommended that patient call 911 or go to the local ED should there be a change in any of these risk factors.     Appearance:   could not assess, phone session    Eye Contact:   could not assess, phone session    Psychomotor Behavior: could not assess, phone session    Attitude:   Cooperative    Orientation:   All   Speech    Rate / Production: Normal/ Responsive Normal     Volume:  Normal    Mood:    Euthymic   Affect:    Appropriate     Thought Content:  Clear    Thought Form:  Coherent  Logical    Insight:    Fair      Medication Review:   No changes to current psychiatric medication(s)     Medication Compliance:   Yes     Changes in Health Issues:   None reported     Chemical Use Review:   Substance Use: Chemical use reviewed, no active concerns identified      Tobacco Use: No current tobacco use.      Diagnosis:  1. Generalized anxiety disorder        Collateral Reports Completed:   Not Applicable    PLAN: (Patient Tasks / Therapist Tasks / Other)  Continue with individual therapy. Homework to think of treatment goals for next session.        Sydnie Elliott, [facility]  August 20, 2020

## 2020-08-27 ENCOUNTER — VIRTUAL VISIT (OUTPATIENT)
Dept: PSYCHOLOGY | Facility: CLINIC | Age: 20
End: 2020-08-27
Payer: COMMERCIAL

## 2020-08-27 DIAGNOSIS — F41.1 GENERALIZED ANXIETY DISORDER: Primary | ICD-10-CM

## 2020-08-27 PROCEDURE — 90837 PSYTX W PT 60 MINUTES: CPT | Mod: 95 | Performed by: COUNSELOR

## 2020-08-27 NOTE — PROGRESS NOTES
"                                             Progress Note    Patient Name: Ana Joshi  Date: 8/27/2020         Service Type: Individual      Session Start Time: 8:00am  Session End Time: 9:05am     Session Length: 65 minutes    Session #: 5    Attendees: Client attended alone    The patient has been notified of the following:      \"We have found that certain health care needs can be provided without the need for a face to face visit.  This service lets us provide the care you need with a phone conversation.       I will have full access to your [place] medical record during this entire phone call.   I will be taking notes for your medical record.      Since this is like an office visit, we will bill your insurance company for this service.       There are potential benefits and risks of telephone visits (e.g. limits to patient confidentiality) that differ from in-person visits.?  Confidentiality still applies for telephone services, and nobody will record the visit.  It is important to be in a quiet, private space that is free of distractions (including cell phone or other devices) during the visit.??      If during the course of the call I believe a telephone visit is not appropriate, you will not be charged for this service\"     Consent has been obtained for this service by care team member: Yes      Treatment Plan Last Reviewed:   PHQ-9 / DAGO-7 :     DATA  Interactive Complexity: No  Crisis: No       Progress Since Last Session (Related to Symptoms / Goals / Homework):   Symptoms: Improving adjusting to being back in her apartment. Thinking through school choices. Having increased anxiety with roommates as she is struggling with their viewpoints related to police and her boyfriend being a . Also concerns about her reaction to Black Lives Matter and a social psychology and race class.    Homework: Did not complete      Episode of Care Goals: Minimal progress - CONTEMPLATION (Considering " "change and yet undecided); Intervened by assessing the negative and positive thinking (ambivalence) about behavior change     Current / Ongoing Stressors and Concerns:   Moved back to school apartment. Feels that her friends and boyfriend are at odds since her boyfriend is a  and her friends are very anti-police currently. Feels that it is difficult to manage relationships and keeping the peace with everyone. Looking at possibility of moving out of house due to roommate's reactions and statements about politics.     Treatment Objective(s) Addressed in This Session:   identify 1 strategies to more effectively address stressors  track and record at least 2 pleasant exchanges with boyfriends and friends   Session was extended due to client's emotions and problem solving thoughts     Intervention:  CBT: Exploring her thought and feelings about different political and social constructs and how her viewpoint is different from her friends at school, and her friends are starting to try and pressure her to change her views. Also making comments about how they \"will be very angry\" if she votes a certain way. Also concerned about her responses in a class related to racial matters. Explored fears and ways to challenge her fears or overcome them, and different ways that she can share her opinion in a respectful way so that she earns participation points.     ASSESSMENT: Current Emotional / Mental Status (status of significant symptoms):   Risk status (Self / Other harm or suicidal ideation)   Patient denies current fears or concerns for personal safety.   Patient denies current or recent suicidal ideation or behaviors.   Patient denies current or recent homicidal ideation or behaviors.   Patient denies current or recent self injurious behavior or ideation.   Patient denies other safety concerns.   Patient reports there has been no change in risk factors since their last session.     Patient reports there has been " no change in protective factors since their last session.     Recommended that patient call 911 or go to the local ED should there be a change in any of these risk factors.     Appearance:   could not assess, phone session    Eye Contact:   could not assess, phone session    Psychomotor Behavior: could not assess, phone session    Attitude:   Cooperative    Orientation:   All   Speech    Rate / Production: Normal/ Responsive Normal     Volume:  Normal    Mood:    Anxious    Affect:    Appropriate    Thought Content:  Clear    Thought Form:  Coherent  Logical    Insight:    Fair      Medication Review:   No changes to current psychiatric medication(s)     Medication Compliance:   Yes     Changes in Health Issues:   None reported     Chemical Use Review:   Substance Use: Chemical use reviewed, no active concerns identified      Tobacco Use: No current tobacco use.      Diagnosis:  1. Generalized anxiety disorder        Collateral Reports Completed:   Not Applicable    PLAN: (Patient Tasks / Therapist Tasks / Other)  Continue with individual therapy. Homework to think of treatment goals for next session.        Sydnie Elliott, LPC  August 27, 2020

## 2021-01-15 NOTE — PROGRESS NOTES
"  SUBJECTIVE:   Ana Joshi is a 17 year old female who presents to clinic today for the following health issues:    Depression and Anxiety Follow-Up    Status since last visit: { :368162::\"No change\"}    Other associated symptoms:{ :799311::\"None\"}    Complicating factors:     Significant life event: { :754038::\"No\"}     Current substance abuse: { :823387::\"None\"}    PHQ-9 Score and MyChart F/U Questions 4/4/2017 11/22/2017   Total Score 5 8   Q9: Suicide Ideation Not at all Not at all     DAGO-7 SCORE 4/4/2017 11/22/2017   Total Score 8 11     {PROVIDER ONLY Complete follow-up questions for patients who report suicide ideation  (Optional):304705}  PHQ-9  English  PHQ-9   Any Language  GAD7  Suicide Assessment Five-step Evaluation and Treatment (SAFE-T)      Amount of exercise or physical activity: {Exercise frequency days per week:021316}    Problems taking medications regularly: {Med Problems:567526::\"No\"}    Medication side effects: {CHRONIC MED SIDE EFFECTS:000287::\"none\"}    Diet: { :547466}        {additional problems for provider to add:747217}    Problem list and histories reviewed & adjusted, as indicated.  Additional history: {NONE - AS DOCUMENTED:040097::\"as documented\"}    {HIST REVIEW/ LINKS 2:645408}    Reviewed and updated as needed this visit by clinical staff     Reviewed and updated as needed this visit by Provider         {PROVIDER CHARTING PREFERENCE:670443}  " 99

## 2021-01-19 DIAGNOSIS — F32.1 MODERATE SINGLE CURRENT EPISODE OF MAJOR DEPRESSIVE DISORDER (H): ICD-10-CM

## 2021-01-20 RX ORDER — FLUOXETINE 40 MG/1
CAPSULE ORAL
Qty: 90 CAPSULE | Refills: 1 | OUTPATIENT
Start: 2021-01-20

## 2021-01-20 NOTE — TELEPHONE ENCOUNTER
Routing refill request to provider for review/approval because:  PHQ-9 needs to be updated     PHQ-9 score:    PHQ 5/20/2020   PHQ-9 Total Score 3   Q9: Thoughts of better off dead/self-harm past 2 weeks Not at all           Pamela Dunlap RN, BSN, CMSRN  [facility]

## 2021-01-20 NOTE — TELEPHONE ENCOUNTER
Pt schedule video visit 1/25/21 with  AYDEE Jimenez CNP.  She will not be out of med by appt date    OLI Carrera.

## 2021-01-25 ENCOUNTER — VIRTUAL VISIT (OUTPATIENT)
Dept: FAMILY MEDICINE | Facility: CLINIC | Age: 21
End: 2021-01-25
Payer: COMMERCIAL

## 2021-01-25 DIAGNOSIS — E66.09 CLASS 1 OBESITY DUE TO EXCESS CALORIES WITH SERIOUS COMORBIDITY AND BODY MASS INDEX (BMI) OF 33.0 TO 33.9 IN ADULT: ICD-10-CM

## 2021-01-25 DIAGNOSIS — E66.811 CLASS 1 OBESITY DUE TO EXCESS CALORIES WITH SERIOUS COMORBIDITY AND BODY MASS INDEX (BMI) OF 33.0 TO 33.9 IN ADULT: ICD-10-CM

## 2021-01-25 DIAGNOSIS — F32.1 MODERATE SINGLE CURRENT EPISODE OF MAJOR DEPRESSIVE DISORDER (H): Primary | ICD-10-CM

## 2021-01-25 PROCEDURE — 99214 OFFICE O/P EST MOD 30 MIN: CPT | Mod: 95 | Performed by: NURSE PRACTITIONER

## 2021-01-25 RX ORDER — FLUOXETINE 40 MG/1
40 CAPSULE ORAL DAILY
Qty: 90 CAPSULE | Refills: 1 | Status: SHIPPED | OUTPATIENT
Start: 2021-01-25 | End: 2021-03-02

## 2021-01-25 ASSESSMENT — PATIENT HEALTH QUESTIONNAIRE - PHQ9
SUM OF ALL RESPONSES TO PHQ QUESTIONS 1-9: 3
5. POOR APPETITE OR OVEREATING: SEVERAL DAYS

## 2021-01-25 ASSESSMENT — ANXIETY QUESTIONNAIRES
2. NOT BEING ABLE TO STOP OR CONTROL WORRYING: SEVERAL DAYS
IF YOU CHECKED OFF ANY PROBLEMS ON THIS QUESTIONNAIRE, HOW DIFFICULT HAVE THESE PROBLEMS MADE IT FOR YOU TO DO YOUR WORK, TAKE CARE OF THINGS AT HOME, OR GET ALONG WITH OTHER PEOPLE: NOT DIFFICULT AT ALL
3. WORRYING TOO MUCH ABOUT DIFFERENT THINGS: MORE THAN HALF THE DAYS
5. BEING SO RESTLESS THAT IT IS HARD TO SIT STILL: NOT AT ALL
GAD7 TOTAL SCORE: 6
7. FEELING AFRAID AS IF SOMETHING AWFUL MIGHT HAPPEN: NOT AT ALL
6. BECOMING EASILY ANNOYED OR IRRITABLE: SEVERAL DAYS
1. FEELING NERVOUS, ANXIOUS, OR ON EDGE: SEVERAL DAYS

## 2021-01-25 NOTE — PATIENT INSTRUCTIONS
Patient Education     Understanding Generalized Anxiety Disorder (DAGO)    Anxiety can fill you with worry and fear. Sometimes anxiety is healthy. It alerts you to a potential threat and gets you to respond and take action. But for some people, anxiety gets so bad it causes problems in daily life. If you find yourself in a constant state of anxiety, you may have an anxiety disorder called generalized anxiety disorder (DAGO). Speak with your healthcare provider or mental health professional to learn more. He or she can help.   What is generalized anxiety disorder?  DAGO means that you are worrying constantly and can t control the worrying. Healthcare providers diagnose DAGO when your worrying happens on most days and for at least 6 months.  With DAGO, you might worry about money, your family and friends, work, or the world in general. You might not even be sure what you're anxious about. But whatever it is, you have an intense fear that the worst will happen. These feelings never really go away. In people age 65 and older, DAGO is one of the most commonly diagnosed anxiety disorders.  Many times it occurs with depression. This constant worry affects your quality of life and makes it hard to function. DAGO can cause physical symptoms, too.  What are common symptoms of generalized anxiety disorder?  People with DAGO often think they have a physical illness. The disorder can cause symptoms, such as:    Excessive worry that interferes with daily activities and lasts for at least 6 months    Muscle tension, especially in the neck and shoulders    Nausea and stomach problems    Frequent headaches    Feeling lightheaded    Restlessness, trouble sleeping    Feeling irritable and on edge all the time  How can generalized anxiety disorder be treated?  DAGO can be treated with medicine or therapy (also called counseling), or both. Medicine helps to reduce symptoms, so you can continue with your daily routine. Therapy helps you  understand the cause of your anxiety and learn how to manage it. Both forms of treatment help you deal with problems that anxiety causes in your life. This helps you to be healthier and happier.  Sk[employer] last reviewed this educational content on 5/1/2017 2000-2020 [employer]. [address] [place] [address], [place] [address]. All rights reserved. This information is not intended as a substitute for professional medical care. Always follow your healthcare professional's instructions.           Patient Education     Weight Management: Fact and Fiction    Knowing the truth about losing weight can help you separate what works from what doesn t. Don t be taken in by expensive weight-loss fads like pills, herbs, and special foods that promise unbelievable results. There s no magic way to lose weight. If you have questions about weight loss, ask your healthcare provider.   Fiction:  The faster I lose weight, the better.   Fact: Rapid weight loss is usually due to loss of water or muscle mass. What you re trying to get rid of is extra fat. Aim to lose a 1/2 pound to 2 pounds a week. Then you re more likely to lose fat rather than water or muscle.   Fiction:  Skipping meals will help me lose weight.   Fact: When you skip meals, you don t give your body the energy it needs to work. Hunger makes you more likely to overeat later on. It s best to spread your meals throughout the day. Eat at least three meals a day.   Fiction:  I can t start exercising until I lose weight.   Fact: The sooner you start exercising the better. Exercise helps burn more calories, tone your muscles, and keep your appetite in check. People who continue to exercise after they lose weight are more likely to keep the weight off.   Fiction:  The fewer calories I eat, the better.   Fact: This seems like it should be true, but it s not. When you eat too few calories, your body acts as if it s on a desert island. It thinks food is scarce, so it  slows down your metabolism (how fast you burn calories) to save energy. By eating too few calories, you make it harder to lose weight.   Fiction:  Once I lose weight, I can go back to living the way I did before.   Fact: Going back to your old eating habits and giving up exercise is a sure way to regain any weight you ve lost. The lifestyle changes that help you lose extra weight can also help keep it off. This is why you need to make realistic changes you can stick with.   Fiction:  Low-fat and fat-free mean low-calorie.   Fact: All foods, even fat-free ones, have calories. Eat too many calories and you ll gain weight. It s OK to treat yourself to a fat-free cookie or two. Just don t eat the whole box! A dietitian will help you figure this out, and will likely recommend that you eat three meals a day, with protein with each meal. Learn to read nutrition labels to see what you are really eating.   [employer] last reviewed this educational content on 4/1/2018 2000-2020 The "[employer]. [address] [place] [address], [place] [address]. All rights reserved. This information is not intended as a substitute for professional medical care. Always follow your healthcare professional's instructions.

## 2021-01-25 NOTE — PROGRESS NOTES
Ana is a 20 year old who is being evaluated via a billable video visit.      How would you like to obtain your AVS? Mail a copy  If the video visit is dropped, the invitation should be resent by: Text to cell phone: [phone]  Will anyone else be joining your video visit? No    Video Start Time: 11:10  Assessment & Plan     Moderate single current episode of major depressive disorder (H)  Refilled Prozac and she is going to schedule with Radha Elliott LPC for counseling. No adverse effects from the Prozac, tolerating well.   - FLUoxetine (PROZAC) 40 MG capsule  Dispense: 90 capsule; Refill: 1    Class 1 obesity due to excess calories with serious comorbidity and body mass index (BMI) of 33.0 to 33.9 in adult  Benefits of weight loss reviewed in detail, encouraged her to cut back on the carbohydrates in the diet, consume more fruits and vegetables, drink plenty of water, avoid fruit juices, sodas, get 150 min moderate exercise/week. Referring to Nutrition as she is undecided on the best weight loss plan for her. Recheck weight in 6 months.    - NUTRITION REFERRAL          25 minutes spent on the date of the encounter doing chart review, history and exam, documentation and further activities as noted above         BMI:   Estimated body mass index is 33.59 kg/m  as calculated from the following:    Height as of 8/5/20: 1.524 m (5').    Weight as of 8/5/20: 78 kg (172 lb).   Sapphire      Work on weight loss  Regular exercise  See Patient Instructions    No follow-ups on file.    AYDEE Jimenez [facility]    Brian Perez is a 20 year old who presents to clinic today for the following health issues   HPI       Anxiety Follow-Up    How are you doing with your anxiety since your last visit? Worsened     Are you having other symptoms that might be associated with anxiety? Yes:  constipation    Have you had a significant life event? No     Are you feeling depressed?  No    Do you have any concerns with your use of alcohol or other drugs? No    Social History     Tobacco Use     Smoking status: Never Smoker     Smokeless tobacco: Never Used   Substance Use Topics     Alcohol use: No     Drug use: No     DAGO-7 SCORE 4/24/2019 2/7/2020 5/20/2020   Total Score 9 6 2     PHQ 4/24/2019 2/7/2020 5/20/2020   PHQ-9 Total Score 9 4 3   Q9: Thoughts of better off dead/self-harm past 2 weeks Not at all Not at all Not at all     Last PHQ-9 1/25/2021   1.  Little interest or pleasure in doing things 0   2.  Feeling down, depressed, or hopeless 1   3.  Trouble falling or staying asleep, or sleeping too much 0   4.  Feeling tired or having little energy 1   5.  Poor appetite or overeating 0   6.  Feeling bad about yourself 0   7.  Trouble concentrating 1   8.  Moving slowly or restless 0   Q9: Thoughts of better off dead/self-harm past 2 weeks 0   PHQ-9 Total Score 3   Difficulty at work, home, or with people Not difficult at all     DAGO-7  1/25/2021   1. Feeling nervous, anxious, or on edge 1   2. Not being able to stop or control worrying 1   3. Worrying too much about different things 2   4. Trouble relaxing 1   5. Being so restless that it is hard to sit still 0   6. Becoming easily annoyed or irritable 1   7. Feeling afraid, as if something awful might happen 0   DAGO-7 Total Score 6   If you checked any problems, how difficult have they made it for you to do your work, take care of things at home, or get along with other people? Not difficult at all         How many servings of fruits and vegetables do you eat daily?  4 or more    On average, how many sweetened beverages do you drink each day (Examples: soda, juice, sweet tea, etc.  Do NOT count diet or artificially sweetened beverages)?   0    How many days per week do you exercise enough to make your heart beat faster? 3 or less    How many minutes a day do you exercise enough to make your heart beat faster? 20 - 29    How many days per  week do you miss taking your medication? 0      Review of Systems   Constitutional, HEENT, cardiovascular, pulmonary, gi and gu systems are negative, except as otherwise noted.      Objective           Vitals:  No vitals were obtained today due to virtual visit.    Physical Exam   GENERAL: Healthy, alert and no distress  EYES: Eyes grossly normal to inspection.  No discharge or erythema, or obvious scleral/conjunctival abnormalities.  HENT: Normal cephalic/atraumatic.  External ears, nose and mouth without ulcers or lesions.  No nasal drainage visible.  NECK: No asymmetry, visible masses or scars  RESP: No audible wheeze, cough, or visible cyanosis.  No visible retractions or increased work of breathing.    SKIN: Visible skin clear. No significant rash, abnormal pigmentation or lesions.  NEURO: Cranial nerves grossly intact.  Mentation and speech appropriate for age.  PSYCH: Mentation appears normal, affect normal/bright, judgement and insight intact, normal speech and appearance well-groomed.    I spent a total of 19 minutes face-to-face with Ana Joshi during today's office visit.  Over 50% of this time was spent counseling the patient and/or coordinating care regarding management of anxiety, depression,.  See note for details.        Video-Visit Details    Type of service:  Video Visit    Video End Time:11:36 AM    Originating Location (pt. Location): Home    Distant Location (provider location):  [facility]     Platform used for Video Visit: [employer]

## 2021-01-26 ASSESSMENT — ANXIETY QUESTIONNAIRES: GAD7 TOTAL SCORE: 6

## 2021-02-03 ENCOUNTER — VIRTUAL VISIT (OUTPATIENT)
Dept: NUTRITION | Facility: CLINIC | Age: 21
End: 2021-02-03
Attending: NURSE PRACTITIONER
Payer: COMMERCIAL

## 2021-02-03 DIAGNOSIS — E66.811 CLASS 1 OBESITY DUE TO EXCESS CALORIES WITH SERIOUS COMORBIDITY AND BODY MASS INDEX (BMI) OF 33.0 TO 33.9 IN ADULT: Primary | ICD-10-CM

## 2021-02-03 DIAGNOSIS — E66.09 CLASS 1 OBESITY DUE TO EXCESS CALORIES WITH SERIOUS COMORBIDITY AND BODY MASS INDEX (BMI) OF 33.0 TO 33.9 IN ADULT: Primary | ICD-10-CM

## 2021-02-03 PROCEDURE — 97802 MEDICAL NUTRITION INDIV IN: CPT | Mod: 95

## 2021-02-03 NOTE — PATIENT INSTRUCTIONS
1. Goal to move for at least 150 minutes a week of aerobic activity a week and 2-3 days of resistance a week.    2. Try plate method:  1/2 plate veggies  1/4 plate lean meat  1/4 plate lean grains, ideally whole grains (1/2-1 cup grains OR 1-2 slices bread) or some fruit/dairy/other carbohydrates    3. Try eating 130 gm carbohydrates a day and see if you feel better.     4. Estimated energy needs for weight loss: 7354-5904 calories a day (average).      5. Limit sweets to a kid-sized portion (100-200 calories)    6. Helpful website: ChoosereBouncesplate.gov  DASH diet and Mediterranean diet also healthy eating plans    FOLLOW UP APPOINTMENT: Video visit follow up on Wednesday, March 3rd at 1:30pm.     Palma Jackson RDN, LD, [facility]   [phone]

## 2021-02-03 NOTE — PROGRESS NOTES
"Type of service:  Video Visit    If the video visit is dropped, the video visit invitation should be resent by: Send to e-mail at: [email or web address]    Originating Location (pt. Location): Home  Distant Location (provider location): Home  Mode of Communication:  Video Conference via [employer]    Video Start Time: 9:07am  Video End Time (time video stopped): 10:09am    How would patient like to obtain AVS? [employer]t (but will also mail a copy since she was having trouble with MyChart access will resend her a code to rejoin/activate)    Medical Nutrition Therapy  Visit Type:Initial assessment and intervention    Ana Joshi presents today for MNT and education related to weight management.   She is accompanied by self.      ASSESSMENT:   Patient comments/concerns relating to nutrition: Feels like since in middle school and high school, loves food.  Says not bulemia or anorexia but if really likes something, will eat it until feeling full.  Says really likes sweets and has same issues.  Says on a low-carbohydrate diet and lost 5 lbs 2 weeks ago.     Says loves to cook and wants to know what to do with eating habits.  Says wants to lose weight and maintain her urge to eat until very full. Says can eat either fast if by herself or slow if talking to others.     Has carbohydrate manager that helps her track carbs.  Loves carbs but has been able to substitute it for other carbs. Lives in her own apartment so cooks a lot. Enjoys going out to eat.     Struggles with anxiety and in the past would skip meals when had an anxiety attack.    NUTRITION HISTORY:  Wakes: 7:30-8:30am (aiming for 23-35gm carbohydrate/day)  Breakfast: 9:30-10:30am: almond muffin and 3x3\" egg bake with cream, green onions, green peppers and breakfast sausage and cheese   AM: noon-1pm:   Lunch: 1:30-2:30pm: soup (cheddar broccoli OR chicken flourentine with artichoke instead of noodles and tomato soup) with low-carbohydrate bread (sandwich " with chicken slice, spinach, swiss cheese, tomato and tapia)  PM: pork rinds OR cottage cheese OR string cheese OR berries  Dinner: 5:30-6:30pm: chicken zucchini casserole with mushroom and onions and gruyere cheese OR sushi, fried rice and orange chicken OR cauliflower chicken pesto OR pork, cauliflower rice   Snacks: something sweet (Alie Truffle OR 2 cookies OR Paty's Kiss)  Beverages: alcohol 0-3 drinks/weekend, made with regular pop, Tea  1-2x/day, Coffee 1-2x/day and Water all/day.  Cut out regular milk.     Misses meals? Rare breakfast or lunch  Eats out:  1 meals/per week     Previous diet education:  No     Food allergies/intolerances: none    Diet is high in: protein and vegetables  Diet is low in: calcium, carbs, fiber and fruits    EXERCISE: Exercise bands- 1 time a week.  Knows she should do more.  Says most of her classes are online.     SOCIO/ECONOMIC:   Lives with: 3 roommates    MEDICATIONS:  Current Outpatient Medications   Medication     FLUoxetine (PROZAC) 40 MG capsule     norethindrone-ethinyl estradiol (ALAYCEN 1/35) 1-35 MG-MCG tablet     No current facility-administered medications for this visit.        LABS:  Last Basic Metabolic Panel:  Lab Results   Component Value Date     04/03/2017      Lab Results   Component Value Date    POTASSIUM 4.2 04/03/2017     Lab Results   Component Value Date    CHLORIDE 107 04/03/2017     Lab Results   Component Value Date    MAREK 8.7 04/03/2017     Lab Results   Component Value Date    CO2 25 04/03/2017     Lab Results   Component Value Date    BUN 15 04/03/2017     Lab Results   Component Value Date    CR 0.75 04/03/2017     Lab Results   Component Value Date    GLC 86 08/05/2020       ANTHROPOMETRICS:  Vitals: There were no vitals taken for this visit.  Estimated body mass index is 33.59 kg/m  as calculated from the following:    Height as of 8/5/20: 1.524 m (5').    Weight as of 8/5/20: 78 kg (172 lb).      Wt Readings from Last 5 Encounters:    08/05/20 78 kg (172 lb)   05/20/20 64.9 kg (143 lb)   05/31/19 75 kg (165 lb 6.4 oz) (90 %, Z= 1.27)*   08/09/18 69.9 kg (154 lb) (85 %, Z= 1.03)*   06/29/18 71.2 kg (157 lb) (87 %, Z= 1.12)*     * Growth percentiles are based on CDC (Girls, 2-20 Years) data.       Weight Change: unable to assess since no recent weight but per patient, lost 3 lbs in the past 2 weeks with low carbohydrate diet.    ESTIMATED KCAL REQUIREMENTS:  1763 kcal per day (based on last clinic weight from 8/5/20)    NUTRITION DIAGNOSIS: Overweight/Obesity related to excessive energy intake as evidenced by weight gain, larger portions and BMI>30    NUTRITION INTERVENTION:  Nutrition Prescription: Energy Intake: 8845-0942 kcal/day for weight loss  MyPlate at meals  Education given to support: general nutrition guidelines, weight reduction, consistent meals, carb counting, fat modification, exercise, fiber, behavior modification, portion control and heart healthy diet  Education Materials Provided: 1500 Calorie 5-day Sample Menus, 100 Calorie Snacks and Weight Loss Tips (mailed)  Motivational Interviewing    PATIENT'S BEHAVIOR CHANGE GOALS:   See Patient Instructions for patient stated behavior change goals. AVS was printed and mailed and also sent Jarvam.    MONITOR / EVALUATE:  RD will monitor/evaluate:  Food / Beverage / Nutrient intake   Weight change    FOLLOW-UP:  Call RD with questions/concerns.   Follow-up appointment scheduled on 3/3/21.     Palma Jackson RDN, LD, [facility]   Time spent in minutes: 62 minutes  Encounter: Individual

## 2021-02-23 NOTE — LETTER
[facility]  [address] [place] [address]  [phone]    2018    Re: Ana Joshi  [address]  [facility] [address]  [phone] (home)     : 2000      To Whom It May Concern:      Ana Joshi was seen in clinic today and is unable to work from 18-18 due to her symptoms and medication side effects.  Please feel free to contact me via phone if you have any questions or concerns.        Sincerely,      Kathryn See IOANA Terry     PROBLEM DIAGNOSES  Problem: Benign neoplasm of unspecified breast  Assessment and Plan:        PROBLEM DIAGNOSES  Problem: Benign neoplasm of unspecified breast  Assessment and Plan:  Pt is scheduled for left breast lumpectomy post amaury  localization on 3/4/21.  Verbal and written pre op instructions reviewed with patient and pt able to verbalize understanding.   Verbal and written instructions given with chlorhexidine wash, pt able to verbalize understanding via teach back method. COVID testing scheduled preop per pt.    Sterile cup given, pt instructed to bring urine sample AM of surgery for UCG and pt able to verbalize understanding.

## 2021-03-03 ENCOUNTER — VIRTUAL VISIT (OUTPATIENT)
Dept: EDUCATION SERVICES | Facility: CLINIC | Age: 21
End: 2021-03-03
Payer: COMMERCIAL

## 2021-03-03 DIAGNOSIS — E66.811 CLASS 1 OBESITY DUE TO EXCESS CALORIES WITH SERIOUS COMORBIDITY AND BODY MASS INDEX (BMI) OF 33.0 TO 33.9 IN ADULT: ICD-10-CM

## 2021-03-03 DIAGNOSIS — E66.09 CLASS 1 OBESITY DUE TO EXCESS CALORIES WITH SERIOUS COMORBIDITY AND BODY MASS INDEX (BMI) OF 33.0 TO 33.9 IN ADULT: ICD-10-CM

## 2021-03-03 PROCEDURE — 97803 MED NUTRITION INDIV SUBSEQ: CPT | Mod: 95

## 2021-03-03 NOTE — PATIENT INSTRUCTIONS
1. Try to get 130 gm carbohydrates more consistently and see if helps with energy.   Add 1 cup grains - this will add 45 gm carbohydrates.    2. Try to pair a vitamin-c rich fruit or veggies with meat and eggs- will help enhance iron.     3. Try to get some daily activity for the day to help with both weight loss and energy    4. Try to stay with more plant based fats instead of animal fats.     5. Could also try a Flintstones/child's chewable MVI is above changes not seem to help.    FOLLOW UP APPOINTMENT: Video visit follow up on Monday, March 29th at 11am.    Palma Jackson RDN, LD, [facility]   [phone]

## 2021-03-03 NOTE — PROGRESS NOTES
Type of service:  Video Visit    If the video visit is dropped, the video visit invitation should be resent by: Send to e-mail at: [email or web address]    Originating Location (pt. Location): Home  Distant Location (provider location): Home  Mode of Communication:  Video Conference via ReInnervate    Video Start Time: 1:42pm  Video End Time (time video stopped): 2:20pm    How would patient like to obtain AVS? [facility]  Medical Nutrition Therapy  Visit Type:Reassessment and intervention    Ana Joshi presents today for MNT and education related to weight management.   She is accompanied by self.     ASSESSMENT:   Patient comments/concerns relating to nutrition: Says has been keeping track of calories and carbohydrates and some days has been reaching her carbohydrate intake and toher days getting more.  Noticing on some days she is lower with carbohydrates.     Says will wake up and has energy, will eat breakfast and then after 1-2 hours and then feel really tied. Usually gets 7-9 hours of sleep.  Says not sure with the diet change if this may be causing it.  Says feeling tired after eating at other times of the day.     Says with the 20-25 gm carbohydrates, was more tired than now.  This has improved but still may occur randomly later in the day.  Says eating more veggies.  On weekend will have more carbs form pizza or chicken but also may drink alcohol.     Says sweet tooth carvings has been better.  Says weight has been stable.  Says finding more [employer] recipes.         NUTRITION HISTORY:  Wakes: 1-2 cups coffee   Breakfast: 9:30-10am: egg bake, yogurt, fruits and vegetables (peppers, mushrooms and green onions), easy cheese (1/4 cup)   Lunch: 1:30-2pm: tuna and broccoli with avocado OR brown rice, broccoli (cheesy bake from [employer]) and 1 slice bonilla  PM: pork rinds or 1/2 cup cottage cheese  Dinner: 6-7pm: chicken/seafood, 1/2 meal fruits and vegetables  Snacks: dark chocolate and bars  Beverages:  1-2 cups coffee, 0-1x/tea, Water all day    Misses meals? none  Eats out:  1 meals/per week     Previous diet education:  Yes     Food allergies/intolerances: none    Diet is high in: vegetables  Diet is low in: fruits    EXERCISE: sporadic or irregular exercise.  Bought exercise bands.     SOCIO/ECONOMIC:   Lives with: 3 roommates    MEDICATIONS:  Current Outpatient Medications   Medication     FLUoxetine (PROZAC) 40 MG capsule     norethindrone-ethinyl estradiol (ALAYCEN 1/35) 1-35 MG-MCG tablet     No current facility-administered medications for this visit.        LABS:  Last Basic Metabolic Panel:  Lab Results   Component Value Date     04/03/2017      Lab Results   Component Value Date    POTASSIUM 4.2 04/03/2017     Lab Results   Component Value Date    CHLORIDE 107 04/03/2017     Lab Results   Component Value Date    MAREK 8.7 04/03/2017     Lab Results   Component Value Date    CO2 25 04/03/2017     Lab Results   Component Value Date    BUN 15 04/03/2017     Lab Results   Component Value Date    CR 0.75 04/03/2017     Lab Results   Component Value Date    GLC 86 08/05/2020       ANTHROPOMETRICS:  Vitals: There were no vitals taken for this visit.  Estimated body mass index is 33.59 kg/m  as calculated from the following:    Height as of 8/5/20: 1.524 m (5').    Weight as of 8/5/20: 78 kg (172 lb).       Wt Readings from Last 5 Encounters:   08/05/20 78 kg (172 lb)   05/20/20 64.9 kg (143 lb)   05/31/19 75 kg (165 lb 6.4 oz) (90 %, Z= 1.27)*   08/09/18 69.9 kg (154 lb) (85 %, Z= 1.03)*   06/29/18 71.2 kg (157 lb) (87 %, Z= 1.12)*     * Growth percentiles are based on CDC (Girls, 2-20 Years) data.       Weight Change: unable to assess since no recent weight. Per patient, weight is stable.    ESTIMATED KCAL REQUIREMENTS:  1763 kcal per day (based on last clinic weight from 8/5/20)     NUTRITION DIAGNOSIS: Overweight/Obesity related to excessive energy intake as evidenced by limited aerobic activity and  BMI>30.    NUTRITION INTERVENTION:  Nutrition Prescription: Energy Intake: 9742-2161 kcal/day for weight loss  Education given to support: general nutrition guidelines, weight reduction, consistent meals, carb counting, fat modification, exercise, fiber, behavior modification, portion control and heart healthy diet  Motivational Interviewing    Discussion: Commended Ana on meeting goal to do plate method more often but says still struggles with carbs and will eat fruit instead of grains.  She is tracking calories and trying to keep between 2479-0682 calories a day as discussed and says weight is stable so pleased she has not gained weight but would like to lose weight.      Answered patient's questions regarding fatigue and possible causes.  Since feeling better with eating more carbs but still having days getting <100 gm/day, suggested trying to get 130 gm daily and see if this helps.  If not, may be helpful to work on getting more iron by pairing iron-rich foods with a vitamin C rich fruits and vegetables at meals and/or trying a daily children's MVI for a couple weeks to see if feels better.  She could also try to go outside for a few minutes daily in sun (vitamin D) and work on increasing her daily activity.  She admits to struggling with being active so adding a few minutes of movement when feeling tired may help and may also aid weight loss progress.  She is agreeable to these suggestions.  She did have a question on fat and encouraged her to focus on fats coming mostly from fats, which tend to provide more unsaturated fat, and limit animal based fats.  She was encouraged to still mostly watch carbohydrate and calories.  Pt verbalized understanding of concepts discussed and recommendations provided.       PATIENT'S BEHAVIOR CHANGE GOALS:   See Patient Instructions for patient stated behavior change goals. AVS sent by [employer].    MONITOR / EVALUATE:  RD will monitor/evaluate:  Food / Beverage / Nutrient  intake   Weight change    FOLLOW-UP:  Call RD with questions/concerns.   Follow-up appointment scheduled on 3/29/21.     Palma Jackson RDN, LD, CDCES   Time spent in minutes: 38 minutes  Encounter: Individual

## 2021-03-09 ENCOUNTER — VIRTUAL VISIT (OUTPATIENT)
Dept: PSYCHOLOGY | Facility: CLINIC | Age: 21
End: 2021-03-09
Payer: COMMERCIAL

## 2021-03-09 DIAGNOSIS — F41.1 GENERALIZED ANXIETY DISORDER: Primary | ICD-10-CM

## 2021-03-09 PROCEDURE — 90837 PSYTX W PT 60 MINUTES: CPT | Mod: 95 | Performed by: COUNSELOR
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
## 2021-03-15 ENCOUNTER — VIRTUAL VISIT (OUTPATIENT)
Dept: PSYCHOLOGY | Facility: CLINIC | Age: 21
End: 2021-03-15
Payer: COMMERCIAL

## 2021-03-15 DIAGNOSIS — F41.1 GENERALIZED ANXIETY DISORDER: Primary | ICD-10-CM

## 2021-03-15 PROCEDURE — 90834 PSYTX W PT 45 MINUTES: CPT | Mod: 95 | Performed by: COUNSELOR
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
## 2021-03-22 ENCOUNTER — VIRTUAL VISIT (OUTPATIENT)
Dept: PSYCHOLOGY | Facility: CLINIC | Age: 21
End: 2021-03-22
Payer: COMMERCIAL

## 2021-03-22 DIAGNOSIS — F41.1 GENERALIZED ANXIETY DISORDER: Primary | ICD-10-CM

## 2021-03-22 PROCEDURE — 90834 PSYTX W PT 45 MINUTES: CPT | Mod: 95 | Performed by: COUNSELOR

## 2021-03-23 NOTE — PROGRESS NOTES
Progress Note    Patient Name: Ana Joshi  Date: 3/23/2021         Service Type: Individual      Session Start Time: 10:00am  Session End Time: 10:50am     Session Length: 50 minutes    Session #: 8    Attendees: Client attended alone    Telemedicine Visit: The patient's condition can be safely assessed and treated via synchronous audio and visual telemedicine encounter.      Reason for Telemedicine Visit: Services only offered telehealth    Originating Site (Patient Location): Patient's home    Distant Site (Provider Location): Provider Remote Setting    Consent:  The patient/guardian has verbally consented to: the potential risks and benefits of telemedicine (video visit) versus in person care; bill my insurance or make self-payment for services provided; and responsibility for payment of non-covered services.     Mode of Communication:  Video Conference via KitCheck    As the provider I attest to compliance with applicable laws and regulations related to telemedicine.     Treatment Plan Last Reviewed: 3/15/2021  PHQ-9 / DAGO-7 :     DATA  Interactive Complexity: No  Crisis: No       Progress Since Last Session (Related to Symptoms / Goals / Homework):   Symptoms: No change still struggling with strong emotions related to friendship group.     Homework: Partially completed      Episode of Care Goals: Minimal progress - CONTEMPLATION (Considering change and yet undecided); Intervened by assessing the negative and positive thinking (ambivalence) about behavior change     Current / Ongoing Stressors and Concerns:  Some continued drama with roommate and friend and feeling like she is getting left out in different situations. Was with friend this weekend and friend invited Ana's roommate to spend the night and sleep over in front of Ana, and then did not ask Ana to stay. Roommate ended up staying with the friend and left Ana by herself.     Treatment  Objective(s) Addressed in This Session:   identify 1 strategies to more effectively address stressors  learn & utilize at least 2 assertive communication skills weekly      Intervention:  CBT: behavioral chained incidents with peers that have been causing and adding to stress and anxiety. Explored different ways that she can engage in communication with these friends/roommate and how to express how these situations are causing her to feel, ways that she can relay her emotions to others without feeling attacked or guilted. Addressed the idea of the Unsent Letter to use with friends in order to express herself without fear of saying something she doesn't mean, and also outlining what is important for her to address when she is ready to confront them.     ASSESSMENT: Current Emotional / Mental Status (status of significant symptoms):   Risk status (Self / Other harm or suicidal ideation)   Patient denies current fears or concerns for personal safety.   Patient denies current or recent suicidal ideation or behaviors.   Patient denies current or recent homicidal ideation or behaviors.   Patient denies current or recent self injurious behavior or ideation.   Patient denies other safety concerns.   Patient reports there has been no change in risk factors since their last session.     Patient reports there has been no change in protective factors since their last session.     Recommended that patient call 911 or go to the local ED should there be a change in any of these risk factors.     Appearance:   could not assess, phone session    Eye Contact:   could not assess, phone session    Psychomotor Behavior: could not assess, phone session    Attitude:   Cooperative    Orientation:   All   Speech    Rate / Production: Normal/ Responsive Normal     Volume:  Normal    Mood:    Anxious    Affect:    Appropriate    Thought Content:  Clear    Thought Form:  Coherent  Logical    Insight:    Fair      Medication Review:   No  changes to current psychiatric medication(s)     Medication Compliance:   Yes     Changes in Health Issues:   None reported     Chemical Use Review:   Substance Use: Chemical use reviewed, no active concerns identified      Tobacco Use: No current tobacco use.      Diagnosis:  1. Generalized anxiety disorder        Collateral Reports Completed:   Not Applicable    PLAN: (Patient Tasks / Therapist Tasks / Other)  Continue with individual therapy. Practice unsent letter technique.      Sydnie Elliott [facility]                                                     Treatment Plan    Client's Name: Ana Joshi  YOB: 2000    Date: 3/15/2021    DSM-V Diagnoses: 300.02 (F41.1) Generalized Anxiety Disorder  Psychosocial / Contextual Factors: social stress, financial stress, school stress  WHODAS:     Referral / Collaboration:  Referral to another professional/service is not indicated at this time..    Anticipated number of session or this episode of care: 15-20      MeasurableTreatment Goal(s) related to diagnosis / functional impairment(s)  Goal 1: Client will report a decrease anxiety symptoms.    Objective #A (Client Action)    Client will use thought-stopping strategy daily to reduce intrusive thoughts.  Status: New - Date: 3/15/2021     Intervention(s)  Therapist will teach though stopping techniques.    Objective #B  Client will identify at least 3 triggers for anxiety.  Status: New - Date: 3/15/2021     Intervention(s)  Therapist will teach emotional recognition/identification.  .    Objective #C  Client will use cognitive strategies identified in therapy to challenge anxious thoughts.  Status: New - Date: 3/15/2021     Intervention(s)  Therapist will teach CBT skills.      Goal 2: Client will report improvements in interpersonal relationship struggles.    Objective #A (Client Action)    Status: New - Date: 3/15/2021     Client will learn & utilize at least 3 assertive communication skills  weekly.    Intervention(s)  Therapist will role-play effective communication skills.    Objective #B  Client will learn & utilize at least 3 assertive communication skills weekly.    Status: New - Date: 3/15/2021     Intervention(s)  Therapist will role-play conflict management.    Objective #C  Client will compile a list of boundaries that they would like to set with others. Peers.  Status: New - Date: 3/15/2021     Intervention(s)  Therapist will teach about healthy boundaries.  .    Patient has reviewed and agreed to the above plan.      Sydnie Elliott, СЕРГЕЙ  March 23, 2021

## 2021-03-29 ENCOUNTER — VIRTUAL VISIT (OUTPATIENT)
Dept: NUTRITION | Facility: CLINIC | Age: 21
End: 2021-03-29
Payer: COMMERCIAL

## 2021-03-29 DIAGNOSIS — E66.09 CLASS 1 OBESITY DUE TO EXCESS CALORIES WITH SERIOUS COMORBIDITY AND BODY MASS INDEX (BMI) OF 33.0 TO 33.9 IN ADULT: ICD-10-CM

## 2021-03-29 DIAGNOSIS — E66.811 CLASS 1 OBESITY DUE TO EXCESS CALORIES WITH SERIOUS COMORBIDITY AND BODY MASS INDEX (BMI) OF 33.0 TO 33.9 IN ADULT: ICD-10-CM

## 2021-03-29 PROCEDURE — 97803 MED NUTRITION INDIV SUBSEQ: CPT | Mod: 95

## 2021-03-29 NOTE — PROGRESS NOTES
Type of service:  Video Visit    If the video visit is dropped, the video visit invitation should be resent by: Send to e-mail at: [email or web address]    Originating Location (pt. Location): Home  Distant Location (provider location): Home  Mode of Communication:  Video Conference via [employer]    Video Start Time: 11:10am  Video End Time (time video stopped): 11:33am due to connection issues (2nd time it froze/cut out at 11:27am and could not reconnect by 11:33am so called her instead).   Continued by telephone visit until 11:52am    How would patient like to obtain AVS? [facility]      Medical Nutrition Therapy  Visit Type:Reassessment and intervention    Ana Joshi presents today for MNT and education related to weight management.   She is accompanied by self.     ASSESSMENT:   Patient comments/concerns relating to nutrition: Says having trouble with weight loss- trying to stay within the 1400 calories a day as recommended.  Says trying to watch portions when eating out.     Says not have trouble with breakfast or lunch or snacks.  Says feels doing well on tracking days she is eating out or eating with friends.  Trying to keep with healthy food choices and making more foods at home.     Says in the fall of 2020, only ate 1 larger meal and was gaining weight. She feels this is from sitting around more.     NUTRITION HISTORY:    Breakfast: egg bake with honey, banana OR oikos yogurt OR whole grain bagel with easy cream cheese  Lunch: 1/2 cup brown rice, meat and veggies  Dinner: pasta OR tacos   Snacks: Smart pop white cheddar popcorn (1 cup) OR 2 Paty kisses OR venison/cheese stick OR veggies OR chocolate chips with few nuts  Beverages: Tea  1 cup/day, Coffee with creamer/day and Water all day    Misses meals? Occasionally breakfast  Eats out:  1 meals/per week     Previous diet education:  Yes     Food allergies/intolerances: none    Diet is high in: calories  Diet is low in: fruits    EXERCISE:  Says trying to do more exercise and stick to a goal - walk around for awhile.    Says walking 3 days of the week for 2 weeks for 30-45 minutes.    SOCIO/ECONOMIC:   Lives with: 3 roommates    MEDICATIONS:  Current Outpatient Medications   Medication     FLUoxetine (PROZAC) 40 MG capsule     norethindrone-ethinyl estradiol (ALAYCEN 1/35) 1-35 MG-MCG tablet     No current facility-administered medications for this visit.        LABS:  Last Basic Metabolic Panel:  Lab Results   Component Value Date     04/03/2017      Lab Results   Component Value Date    POTASSIUM 4.2 04/03/2017     Lab Results   Component Value Date    CHLORIDE 107 04/03/2017     Lab Results   Component Value Date    MAREK 8.7 04/03/2017     Lab Results   Component Value Date    CO2 25 04/03/2017     Lab Results   Component Value Date    BUN 15 04/03/2017     Lab Results   Component Value Date    CR 0.75 04/03/2017     Lab Results   Component Value Date    GLC 86 08/05/2020       ANTHROPOMETRICS:  Vitals: There were no vitals taken for this visit.  Estimated body mass index is 33.59 kg/m  as calculated from the following:    Height as of 8/5/20: 1.524 m (5').    Weight as of 8/5/20: 78 kg (172 lb).        Wt Readings from Last 5 Encounters:   08/05/20 78 kg (172 lb)   05/20/20 64.9 kg (143 lb)   05/31/19 75 kg (165 lb 6.4 oz) (90 %, Z= 1.27)*   08/09/18 69.9 kg (154 lb) (85 %, Z= 1.03)*   06/29/18 71.2 kg (157 lb) (87 %, Z= 1.12)*     * Growth percentiles are based on CDC (Girls, 2-20 Years) data.       Weight Change: unable to assess- no recent weight but patient indicates she is not seeing weight loss this past month.  Says clothing a little looser for some articles but others is tight.    ESTIMATED KCAL REQUIREMENTS:  1763 kcal per day (based on last clinic weight from 8/5/20)     NUTRITION DIAGNOSIS: Overweight/Obesity related to excessive energy intake prior to changes as evidenced by BMI>30 and patient reports struggle with weight  loss.    NUTRITION INTERVENTION:  Nutrition Prescription: Energy Intake: 1400 kcal/day for weight loss  Education given to support: general nutrition guidelines, weight reduction, consistent meals, carb counting, exercise, dining out/special occasions, fiber, behavior modification, portion control and heart healthy diet  Motivational Interviewing    Discussion: Ana indicates she is not seeing much weight loss progress.  Says recently has been trying to walk 3 days a week.  Does not feel overall cutting back on portions is drastic, just getting fewer calories by eating more fruits and vegetables.  Says she is usually eating 3 meals a day. Feeling better with eating more carbohydrates.  She denies much changes to how clothing is fitting, which may indicate muscle building.     Discussed options to try to help weight loss to continue to work on being more active or trying to adjust calories. She denies falling below 1200 calories a day often and is normally consuming more than this.  Suspect being more active will be most helpful on long-term weight loss progress by increasing metabolism through muscle building and helping body burn more fat. Ana agrees with this and would like to try to be more active for a few more weeks.  If this is not enough to help with weight loss, can consider slightly raising or lowering calories to see if more/less may work better for her.  Pt verbalized understanding of concepts discussed and recommendations provided.       PATIENT'S BEHAVIOR CHANGE GOALS:   See Patient Instructions for patient stated behavior change goals. AVS was sent by [employer].    MONITOR / EVALUATE:  RD will monitor/evaluate:  Food / Beverage / Nutrient intake   Weight change    FOLLOW-UP:  Call RD with questions/concerns.   Follow-up appointment scheduled on 4/13/21.     Palma Jackson RDN, MATTHEW, ELIZABETHES   Time spent in minutes: 36 minutes  Encounter: Individual

## 2021-03-29 NOTE — PATIENT INSTRUCTIONS
1. Doing a great job with eating more carbohydrates and trying to be more active.    2. Let's give increased activity a few more weeks since will have more long term benefits with building muscle and burning fat, which may make weight loss easier over time.    3. If that is not enough to help, we can try raising or lowering average calorie intake by 100-200 a day and see if more or less works better.   Usually I recommend trying more first before cutting out in case it may help overcome the plateau.    FOLLOW UP: Video visit follow up at 12pm (noon) on Tuesday, April 13th.  We only have 30 minutes so please join as soon as you can.    Palma Jackson,  SHEMAR, LD, [facility] [place][note 2]   [phone]

## 2021-04-15 ENCOUNTER — VIRTUAL VISIT (OUTPATIENT)
Dept: PSYCHOLOGY | Facility: CLINIC | Age: 21
End: 2021-04-15
Payer: COMMERCIAL

## 2021-04-15 DIAGNOSIS — F41.1 GENERALIZED ANXIETY DISORDER: Primary | ICD-10-CM

## 2021-04-15 PROCEDURE — 90834 PSYTX W PT 45 MINUTES: CPT | Mod: 95 | Performed by: COUNSELOR

## 2021-04-19 NOTE — PROGRESS NOTES
Progress Note    Patient Name: Ana Joshi  Date: 4/15/2021         Service Type: Individual      Session Start Time: 12:00pm  Session End Time: 12:50pm     Session Length: 50 minutes    Session #: 9    Attendees: Client attended alone    Telemedicine Visit: The patient's condition can be safely assessed and treated via synchronous audio and visual telemedicine encounter.      Reason for Telemedicine Visit: Services only offered telehealth    Originating Site (Patient Location): Patient's home    Distant Site (Provider Location): Provider Remote Setting    Consent:  The patient/guardian has verbally consented to: the potential risks and benefits of telemedicine (video visit) versus in person care; bill my insurance or make self-payment for services provided; and responsibility for payment of non-covered services.     Mode of Communication:  Video Conference via [employer]    As the provider I attest to compliance with applicable laws and regulations related to telemedicine.     Treatment Plan Last Reviewed: 3/15/2021  PHQ-9 / DAGO-7 :     DATA  Interactive Complexity: No  Crisis: No       Progress Since Last Session (Related to Symptoms / Goals / Homework):   Symptoms: No change still struggling with strong emotions related to friendship group.     Homework: Partially completed      Episode of Care Goals: Minimal progress - CONTEMPLATION (Considering change and yet undecided); Intervened by assessing the negative and positive thinking (ambivalence) about behavior change     Current / Ongoing Stressors and Concerns:  Some continued drama with roommate and friend and feeling like she is getting left out in different situations. Has decided that she and current roommate are not going to live together again next year. Also has a former friend coming into town and wants to work on repairing that relationship.     Treatment Objective(s) Addressed in This  Session:   identify 1 strategies to more effectively address stressors  learn & utilize at least 2 assertive communication skills weekly      Intervention:  DBT: behavioral chained incidents with former friend and guilt/responsibility that Ana holds about that relationship and the relationship ending. Exploring ways that she can work on repairing the relationship and helping the peer understand where Ana is coming from to help improve their relationship. Looking at ways to not take sole responsibility for the relationship ending, as well as ways to cope if peer does not want to repair friendship.     ASSESSMENT: Current Emotional / Mental Status (status of significant symptoms):   Risk status (Self / Other harm or suicidal ideation)   Patient denies current fears or concerns for personal safety.   Patient denies current or recent suicidal ideation or behaviors.   Patient denies current or recent homicidal ideation or behaviors.   Patient denies current or recent self injurious behavior or ideation.   Patient denies other safety concerns.   Patient reports there has been no change in risk factors since their last session.     Patient reports there has been no change in protective factors since their last session.     Recommended that patient call 911 or go to the local ED should there be a change in any of these risk factors.     Appearance:   could not assess, phone session    Eye Contact:   could not assess, phone session    Psychomotor Behavior: could not assess, phone session    Attitude:   Cooperative    Orientation:   All   Speech    Rate / Production: Normal/ Responsive Normal     Volume:  Normal    Mood:    Anxious    Affect:    Appropriate    Thought Content:  Clear    Thought Form:  Coherent  Logical    Insight:    Fair      Medication Review:   No changes to current psychiatric medication(s)     Medication Compliance:   Yes     Changes in Health Issues:   None reported     Chemical Use  Review:   Substance Use: Chemical use reviewed, no active concerns identified      Tobacco Use: No current tobacco use.      Diagnosis:  1. Generalized anxiety disorder        Collateral Reports Completed:   Not Applicable    PLAN: (Patient Tasks / Therapist Tasks / Other)  Continue with individual therapy. Practice communication skills and decrease guilt/shame around relationships      Sydnie Elliott LPC                                                     Treatment Plan    Client's Name: Ana Joshi  YOB: 2000    Date: 3/15/2021    DSM-V Diagnoses: 300.02 (F41.1) Generalized Anxiety Disorder  Psychosocial / Contextual Factors: social stress, financial stress, school stress  WHODAS:     Referral / Collaboration:  Referral to another professional/service is not indicated at this time..    Anticipated number of session or this episode of care: 15-20      MeasurableTreatment Goal(s) related to diagnosis / functional impairment(s)  Goal 1: Client will report a decrease anxiety symptoms.    Objective #A (Client Action)    Client will use thought-stopping strategy daily to reduce intrusive thoughts.  Status: New - Date: 3/15/2021     Intervention(s)  Therapist will teach though stopping techniques.    Objective #B  Client will identify at least 3 triggers for anxiety.  Status: New - Date: 3/15/2021     Intervention(s)  Therapist will teach emotional recognition/identification.  .    Objective #C  Client will use cognitive strategies identified in therapy to challenge anxious thoughts.  Status: New - Date: 3/15/2021     Intervention(s)  Therapist will teach CBT skills.      Goal 2: Client will report improvements in interpersonal relationship struggles.    Objective #A (Client Action)    Status: New - Date: 3/15/2021     Client will learn & utilize at least 3 assertive communication skills weekly.    Intervention(s)  Therapist will role-play effective communication skills.    Objective #B  Client  will learn & utilize at least 3 assertive communication skills weekly.    Status: New - Date: 3/15/2021     Intervention(s)  Therapist will role-play conflict management.    Objective #C  Client will compile a list of boundaries that they would like to set with others. Peers.  Status: New - Date: 3/15/2021     Intervention(s)  Therapist will teach about healthy boundaries.  .    Patient has reviewed and agreed to the above plan.      Sydnie Elliott, СЕРГЕЙ  April 19, 2021

## 2021-05-04 ENCOUNTER — VIRTUAL VISIT (OUTPATIENT)
Dept: PSYCHOLOGY | Facility: CLINIC | Age: 21
End: 2021-05-04
Payer: COMMERCIAL

## 2021-05-04 DIAGNOSIS — F41.1 GENERALIZED ANXIETY DISORDER: Primary | ICD-10-CM

## 2021-05-04 PROCEDURE — 90834 PSYTX W PT 45 MINUTES: CPT | Mod: 95 | Performed by: COUNSELOR

## 2021-05-05 NOTE — PROGRESS NOTES
Progress Note    Patient Name: Ana Joshi  Date: 5/5/2021         Service Type: Individual      Session Start Time: 2:00pm  Session End Time: 2:45pm     Session Length: 45minutes    Session #: 10    Attendees: Client attended alone    Telemedicine Visit: The patient's condition can be safely assessed and treated via synchronous audio and visual telemedicine encounter.      Reason for Telemedicine Visit: Services only offered telehealth    Originating Site (Patient Location): Patient's home    Distant Site (Provider Location): Provider Remote Setting    Consent:  The patient/guardian has verbally consented to: the potential risks and benefits of telemedicine (video visit) versus in person care; bill my insurance or make self-payment for services provided; and responsibility for payment of non-covered services.     Mode of Communication:  Video Conference via [employer]    As the provider I attest to compliance with applicable laws and regulations related to telemedicine.     Treatment Plan Last Reviewed: 3/15/2021  PHQ-9 / DAGO-7 :     DATA  Interactive Complexity: No  Crisis: No       Progress Since Last Session (Related to Symptoms / Goals / Homework):   Symptoms: Improving starting to notice positive improvements in her relationships with others .     Homework: Partially completed      Episode of Care Goals: Minimal progress - CONTEMPLATION (Considering change and yet undecided); Intervened by assessing the negative and positive thinking (ambivalence) about behavior change     Current / Ongoing Stressors and Concerns:  Moved into a new apartment with friend and another roommate. Starting to stress about finances and how she is going to pay for living in apartment with new roommate. Looking into getting a new job or a second job in order to decrease her stress and anxiety.     Treatment Objective(s) Addressed in This Session:   identify 1 strategies to more  effectively address stressors  learn & utilize at least 2 assertive communication skills weekly      Intervention:  CBT: Looking at ways that she can manage her stress and ways that she can identify her stressors, seek additional supports in order to feel that she can communicate her needs to others.      ASSESSMENT: Current Emotional / Mental Status (status of significant symptoms):   Risk status (Self / Other harm or suicidal ideation)   Patient denies current fears or concerns for personal safety.   Patient denies current or recent suicidal ideation or behaviors.   Patient denies current or recent homicidal ideation or behaviors.   Patient denies current or recent self injurious behavior or ideation.   Patient denies other safety concerns.   Patient reports there has been no change in risk factors since their last session.     Patient reports there has been no change in protective factors since their last session.     Recommended that patient call 911 or go to the local ED should there be a change in any of these risk factors.     Appearance:   could not assess, phone session    Eye Contact:   could not assess, phone session    Psychomotor Behavior: could not assess, phone session    Attitude:   Cooperative    Orientation:   All   Speech    Rate / Production: Normal/ Responsive Normal     Volume:  Normal    Mood:    Anxious    Affect:    Appropriate    Thought Content:  Clear    Thought Form:  Coherent  Logical    Insight:    Fair      Medication Review:   No changes to current psychiatric medication(s)     Medication Compliance:   Yes     Changes in Health Issues:   None reported     Chemical Use Review:   Substance Use: Chemical use reviewed, no active concerns identified      Tobacco Use: No current tobacco use.      Diagnosis:  1. Generalized anxiety disorder        Collateral Reports Completed:   Not Applicable    PLAN: (Patient Tasks / Therapist Tasks / Other)  Continue with individual therapy. Practice  calming techniques learned in session       Sydnie Elliott [facility]                                                     Treatment Plan    Client's Name: Ana Joshi  YOB: 2000    Date: 3/15/2021    DSM-V Diagnoses: 300.02 (F41.1) Generalized Anxiety Disorder  Psychosocial / Contextual Factors: social stress, financial stress, school stress  WHODAS:     Referral / Collaboration:  Referral to another professional/service is not indicated at this time..    Anticipated number of session or this episode of care: 15-20      MeasurableTreatment Goal(s) related to diagnosis / functional impairment(s)  Goal 1: Client will report a decrease anxiety symptoms.    Objective #A (Client Action)    Client will use thought-stopping strategy daily to reduce intrusive thoughts.  Status: New - Date: 3/15/2021     Intervention(s)  Therapist will teach though stopping techniques.    Objective #B  Client will identify at least 3 triggers for anxiety.  Status: New - Date: 3/15/2021     Intervention(s)  Therapist will teach emotional recognition/identification.  .    Objective #C  Client will use cognitive strategies identified in therapy to challenge anxious thoughts.  Status: New - Date: 3/15/2021     Intervention(s)  Therapist will teach CBT skills.      Goal 2: Client will report improvements in interpersonal relationship struggles.    Objective #A (Client Action)    Status: New - Date: 3/15/2021     Client will learn & utilize at least 3 assertive communication skills weekly.    Intervention(s)  Therapist will role-play effective communication skills.    Objective #B  Client will learn & utilize at least 3 assertive communication skills weekly.    Status: New - Date: 3/15/2021     Intervention(s)  Therapist will role-play conflict management.    Objective #C  Client will compile a list of boundaries that they would like to set with others. Peers.  Status: New - Date: 3/15/2021     Intervention(s)  Therapist will  teach about healthy boundaries.  .    Patient has reviewed and agreed to the above plan.      Sydnie Elliott, СЕРГЕЙ  May 5, 2021

## 2021-05-10 ENCOUNTER — OFFICE VISIT (OUTPATIENT)
Dept: FAMILY MEDICINE | Facility: CLINIC | Age: 21
End: 2021-05-10
Payer: COMMERCIAL

## 2021-05-10 VITALS
HEART RATE: 90 BPM | RESPIRATION RATE: 16 BRPM | WEIGHT: 196 LBS | SYSTOLIC BLOOD PRESSURE: 118 MMHG | TEMPERATURE: 98.5 F | OXYGEN SATURATION: 98 % | DIASTOLIC BLOOD PRESSURE: 80 MMHG | BODY MASS INDEX: 38.28 KG/M2

## 2021-05-10 DIAGNOSIS — Z23 NEED FOR TDAP VACCINATION: ICD-10-CM

## 2021-05-10 DIAGNOSIS — Z11.1 SCREENING EXAMINATION FOR PULMONARY TUBERCULOSIS: ICD-10-CM

## 2021-05-10 DIAGNOSIS — Z11.59 NEED FOR HEPATITIS C SCREENING TEST: ICD-10-CM

## 2021-05-10 DIAGNOSIS — R42 DIZZINESS: ICD-10-CM

## 2021-05-10 DIAGNOSIS — Z01.84 IMMUNITY STATUS TESTING: Primary | ICD-10-CM

## 2021-05-10 DIAGNOSIS — Z30.41 ENCOUNTER FOR SURVEILLANCE OF CONTRACEPTIVE PILLS: ICD-10-CM

## 2021-05-10 LAB
BASOPHILS # BLD AUTO: 0 10E9/L (ref 0–0.2)
BASOPHILS NFR BLD AUTO: 0.4 %
DIFFERENTIAL METHOD BLD: NORMAL
EOSINOPHIL # BLD AUTO: 0.1 10E9/L (ref 0–0.7)
EOSINOPHIL NFR BLD AUTO: 1.1 %
ERYTHROCYTE [DISTWIDTH] IN BLOOD BY AUTOMATED COUNT: 13.7 % (ref 10–15)
FERRITIN SERPL-MCNC: 15 NG/ML (ref 12–150)
HCT VFR BLD AUTO: 41.4 % (ref 35–47)
HGB BLD-MCNC: 13.6 G/DL (ref 11.7–15.7)
IRON SATN MFR SERPL: 20 % (ref 15–46)
IRON SERPL-MCNC: 94 UG/DL (ref 35–180)
LYMPHOCYTES # BLD AUTO: 1.9 10E9/L (ref 0.8–5.3)
LYMPHOCYTES NFR BLD AUTO: 22.7 %
MCH RBC QN AUTO: 29.9 PG (ref 26.5–33)
MCHC RBC AUTO-ENTMCNC: 32.9 G/DL (ref 31.5–36.5)
MCV RBC AUTO: 91 FL (ref 78–100)
MONOCYTES # BLD AUTO: 0.5 10E9/L (ref 0–1.3)
MONOCYTES NFR BLD AUTO: 5.9 %
NEUTROPHILS # BLD AUTO: 6 10E9/L (ref 1.6–8.3)
NEUTROPHILS NFR BLD AUTO: 69.9 %
PLATELET # BLD AUTO: 354 10E9/L (ref 150–450)
RBC # BLD AUTO: 4.55 10E12/L (ref 3.8–5.2)
TIBC SERPL-MCNC: 475 UG/DL (ref 240–430)
TSH SERPL DL<=0.005 MIU/L-ACNC: 1.85 MU/L (ref 0.4–4)
WBC # BLD AUTO: 8.5 10E9/L (ref 4–11)

## 2021-05-10 PROCEDURE — 83550 IRON BINDING TEST: CPT | Performed by: NURSE PRACTITIONER

## 2021-05-10 PROCEDURE — 86706 HEP B SURFACE ANTIBODY: CPT | Performed by: NURSE PRACTITIONER

## 2021-05-10 PROCEDURE — 85025 COMPLETE CBC W/AUTO DIFF WBC: CPT | Performed by: NURSE PRACTITIONER

## 2021-05-10 PROCEDURE — 99395 PREV VISIT EST AGE 18-39: CPT | Mod: 25 | Performed by: NURSE PRACTITIONER

## 2021-05-10 PROCEDURE — 82306 VITAMIN D 25 HYDROXY: CPT | Performed by: NURSE PRACTITIONER

## 2021-05-10 PROCEDURE — 84443 ASSAY THYROID STIM HORMONE: CPT | Performed by: NURSE PRACTITIONER

## 2021-05-10 PROCEDURE — 83540 ASSAY OF IRON: CPT | Performed by: NURSE PRACTITIONER

## 2021-05-10 PROCEDURE — 86803 HEPATITIS C AB TEST: CPT | Performed by: NURSE PRACTITIONER

## 2021-05-10 PROCEDURE — 86762 RUBELLA ANTIBODY: CPT | Performed by: NURSE PRACTITIONER

## 2021-05-10 PROCEDURE — 36415 COLL VENOUS BLD VENIPUNCTURE: CPT | Performed by: NURSE PRACTITIONER

## 2021-05-10 PROCEDURE — 90715 TDAP VACCINE 7 YRS/> IM: CPT | Performed by: NURSE PRACTITIONER

## 2021-05-10 PROCEDURE — 82728 ASSAY OF FERRITIN: CPT | Performed by: NURSE PRACTITIONER

## 2021-05-10 PROCEDURE — 86481 TB AG RESPONSE T-CELL SUSP: CPT | Performed by: NURSE PRACTITIONER

## 2021-05-10 PROCEDURE — 86787 VARICELLA-ZOSTER ANTIBODY: CPT | Performed by: NURSE PRACTITIONER

## 2021-05-10 PROCEDURE — 99213 OFFICE O/P EST LOW 20 MIN: CPT | Mod: 25 | Performed by: NURSE PRACTITIONER

## 2021-05-10 PROCEDURE — 86765 RUBEOLA ANTIBODY: CPT | Performed by: NURSE PRACTITIONER

## 2021-05-10 PROCEDURE — 86735 MUMPS ANTIBODY: CPT | Mod: 90 | Performed by: NURSE PRACTITIONER

## 2021-05-10 PROCEDURE — 99000 SPECIMEN HANDLING OFFICE-LAB: CPT | Performed by: NURSE PRACTITIONER

## 2021-05-10 PROCEDURE — 90471 IMMUNIZATION ADMIN: CPT | Performed by: NURSE PRACTITIONER

## 2021-05-10 RX ORDER — NORETHINDRONE AND ETHINYL ESTRADIOL 1 MG-35MCG
1 KIT ORAL DAILY
Qty: 84 TABLET | Refills: 1 | Status: SHIPPED | OUTPATIENT
Start: 2021-05-10 | End: 2021-08-10

## 2021-05-10 ASSESSMENT — PAIN SCALES - GENERAL: PAINLEVEL: NO PAIN (0)

## 2021-05-10 NOTE — NURSING NOTE
Prior to immunization administration, verified patients identity using patient s name and date of birth. Please see Immunization Activity for additional information.     Screening Questionnaire for Adult Immunization    Are you sick today?   No   Do you have allergies to medications, food, a vaccine component or latex?   No   Have you ever had a serious reaction after receiving a vaccination?   No   Do you have a long-term health problem with heart, lung, kidney, or metabolic disease (e.g., diabetes), asthma, a blood disorder, no spleen, complement component deficiency, a cochlear implant, or a spinal fluid leak?  Are you on long-term aspirin therapy?   No   Do you have cancer, leukemia, HIV/AIDS, or any other immune system problem?   No   Do you have a parent, brother, or sister with an immune system problem?   No   In the past 3 months, have you taken medications that affect  your immune system, such as prednisone, other steroids, or anticancer drugs; drugs for the treatment of rheumatoid arthritis, Crohn s disease, or psoriasis; or have you had radiation treatments?   No   Have you had a seizure, or a brain or other nervous system problem?   No   During the past year, have you received a transfusion of blood or blood    products, or been given immune (gamma) globulin or antiviral drug?   No   For women: Are you pregnant or is there a chance you could become       pregnant during the next month?   No   Have you received any vaccinations in the past 4 weeks?   No     Immunization questionnaire answers were all negative.        Per orders of Elaine Beckwith, injection of tdap given by Kourtney Tam. Patient instructed to remain in clinic for 15 minutes afterwards, and to report any adverse reaction to me immediately.       Screening performed by Kourtney Tam on 5/10/2021 at 12:42 PM.

## 2021-05-10 NOTE — PROGRESS NOTES
SUBJECTIVE:   CC: Ana Joshi is an 20 year old woman who presents for preventive health visit.       Patient has been advised of split billing requirements and indicates understanding: Yes  Healthy Habits:    Do you get at least three servings of calcium containing foods daily (dairy, green leafy vegetables, etc.)? yes    Amount of exercise or daily activities, outside of work: 3 day(s) per week    Problems taking medications regularly No    Medication side effects: Yes dizziness with Prozac (when laying down)    Have you had an eye exam in the past two years? yes    Do you see a dentist twice per year? yes    Do you have sleep apnea, excessive snoring or daytime drowsiness?no    Did go up to Prozac in fall.  Noted dizziness after this.  Happens when she lays down and lasts 1-2 minutes.  Denies headache, chest pain, shortness of breath, weakness, or vision changes. No dizziness with exercise or exertion. Feels like room is spinning when it comes on.    Did feel like the increase in prozac dose was helpful for her anxiety.       Today's PHQ-2 Score:   PHQ-2 ( 2000 [employer]) 5/10/2021 1/25/2021   Q1: Little interest or pleasure in doing things 0 0   Q2: Feeling down, depressed or hopeless 0 1   PHQ-2 Score 0 1       Abuse: Current or Past(Physical, Sexual or Emotional)- No  Do you feel safe in your environment? Yes    Have you ever done Advance Care Planning? (For example, a Health Directive, POLST, or a discussion with a medical provider or your loved ones about your wishes): No, advance care planning information given to patient to review.  Patient declined advance care planning discussion at this time.    Social History     Tobacco Use     Smoking status: Never Smoker     Smokeless tobacco: Never Used   Substance Use Topics     Alcohol use: No     If you drink alcohol do you typically have >3 drinks per day or >7 drinks per week? No                     Reviewed orders with patient.  Reviewed health  maintenance and updated orders accordingly - Yes  BP Readings from Last 3 Encounters:   05/10/21 118/80   20 120/87   19 125/85    Wt Readings from Last 3 Encounters:   05/10/21 88.9 kg (196 lb)   20 78 kg (172 lb)   20 64.9 kg (143 lb)                  Patient Active Problem List   Diagnosis     Hyperhydrosis disorder     Acne     Infectious mononucleosis     Hx of excision of hemangioma     Snoring     Moderate single current episode of major depressive disorder (H)     Past Surgical History:   Procedure Laterality Date     TRACHEOSTOMY CHILD  birth to 2 yo     Trached until age 1 and lazer surgeries       Social History     Tobacco Use     Smoking status: Never Smoker     Smokeless tobacco: Never Used   Substance Use Topics     Alcohol use: No     Family History   Problem Relation Age of Onset     Allergies Mother         Penicillin, maybe seasonal     Anxiety Disorder Mother      Unknown/Adopted Father      Asthma Father      Allergies Father      Cancer Maternal Grandmother         Lung     Other Cancer Maternal Grandmother         bone     Thyroid Disease Maternal Grandmother      Cancer Maternal Grandfather         Lymphatic     Hypertension Maternal Grandfather      Cerebrovascular Disease Maternal Grandfather      Other Cancer Maternal Grandfather      Heart Disease Maternal Uncle 1         of CHF         Current Outpatient Medications   Medication Sig Dispense Refill     FLUoxetine (PROZAC) 40 MG capsule TAKE 1 CAPSULE BY MOUTH EVERY DAY 90 capsule 1     norethindrone-ethinyl estradiol (ALAYCEN 1/35) 1-35 MG-MCG tablet Take 1 tablet by mouth daily 84 tablet 1     No Known Allergies        Pertinent mammograms are reviewed under the imaging tab.  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       Past Surgical History:   Procedure Laterality Date     TRACHEOSTOMY CHILD  birth to 1 yo     Trached until age 1 and lazer surgeries       ROS:  CONSTITUTIONAL: NEGATIVE for fever, chills, change in weight  INTEGUMENTARU/SKIN: NEGATIVE for worrisome rashes, moles or lesions  EYES: NEGATIVE for vision changes or irritation  ENT: NEGATIVE for ear, mouth and throat problems  RESP: NEGATIVE for significant cough or SOB  BREAST: NEGATIVE for masses, tenderness or discharge  CV: NEGATIVE for chest pain, palpitations or peripheral edema  GI: NEGATIVE for nausea, abdominal pain, heartburn, or change in bowel habits  : NEGATIVE for unusual urinary or vaginal symptoms. Periods are regular.  MUSCULOSKELETAL: NEGATIVE for significant arthralgias or myalgia  NEURO: NEGATIVE for weakness, dizziness or paresthesias  PSYCHIATRIC: NEGATIVE for changes in mood or affect    OBJECTIVE:   /80 (BP Location: Left arm, Patient Position: Chair, Cuff Size: Adult Large)   Pulse 90   Temp 98.5  F (36.9  C) (Tympanic)   Resp 16   Wt 88.9 kg (196 lb)   SpO2 98%   BMI 38.28 kg/m    EXAM:  GENERAL: healthy, alert and no distress  EYES: Eyes grossly normal to inspection, PERRL and conjunctivae and sclerae normal  HENT: ear canals and TM's normal, nose and mouth without ulcers or lesions  NECK: no adenopathy, no asymmetry, masses, or scars and thyroid normal to palpation  RESP: lungs clear to auscultation - no rales, rhonchi or wheezes  CV: regular rate and rhythm, normal S1 S2, no S3 or S4, no murmur, click or rub, no peripheral edema and  peripheral pulses strong  ABDOMEN: soft, nontender, no hepatosplenomegaly, no masses and bowel sounds normal  MS: no gross musculoskeletal defects noted, no edema  NEURO: Normal strength and tone, sensory exam grossly normal, mentation intact and negative mansi hallpike    Diagnostic Test Results:  Labs reviewed in Epic  Results for orders placed or performed in visit on 05/10/21 (from the past 24 hour(s))   CBC with platelets and differential   Result Value Ref Range    WBC 8.5 4.0 - 11.0 10e9/L    RBC Count 4.55 3.8 - 5.2 10e12/L    Hemoglobin 13.6 11.7 - 15.7 g/dL    Hematocrit 41.4 35.0 - 47.0 %    MCV 91 78 - 100 fl    MCH 29.9 26.5 - 33.0 pg    MCHC 32.9 31.5 - 36.5 g/dL    RDW 13.7 10.0 - 15.0 %    Platelet Count 354 150 - 450 10e9/L    % Neutrophils 69.9 %    % Lymphocytes 22.7 %    % Monocytes 5.9 %    % Eosinophils 1.1 %    % Basophils 0.4 %    Absolute Neutrophil 6.0 1.6 - 8.3 10e9/L    Absolute Lymphocytes 1.9 0.8 - 5.3 10e9/L    Absolute Monocytes 0.5 0.0 - 1.3 10e9/L    Absolute Eosinophils 0.1 0.0 - 0.7 10e9/L    Absolute Basophils 0.0 0.0 - 0.2 10e9/L    Diff Method Automated Method        ASSESSMENT/PLAN:   1. 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   - Hepatitis B Surface Antibody  - Rubeola Antibody IgG  - Rubella Antibody IgG Quantitative  - Mumps Immune Status, IgG  - Varicella Zoster Virus Antibody IgG    2. Dizziness  Labs as below.  See patient instructions.   - CBC with platelets and differential  - Ferritin  - Iron and iron binding capacity  - TSH with free T4 reflex  - Vitamin D Deficiency    3. Encounter for surveillance of contraceptive pills  Patient likes OCPs as method of contraception.  Takes pill daily around the same time.  Uses condoms to protect against sexually transmitted infections.  Denies abdominal pain, chest pain, headaches, eye problems, and swelling or aching in legs.  - norethindrone-ethinyl estradiol (ALAYCEN 1/35) 1-35 MG-MCG  tablet; Take 1 tablet by mouth daily  Dispense: 84 tablet; Refill: 1    4. Need for hepatitis C screening test  - Hepatitis C Screen Reflex to HCV RNA Quant and Genotype    5. Screening examination for pulmonary tuberculosis  For school.  - Quantiferon TB Gold Plus    6. Need for Tdap vaccination  - TDAP VACCINE (Adacel, Boostrix)  [[id number]]    Patient has been advised of split billing requirements and indicates understanding: Yes  COUNSELING:   Reviewed preventive health counseling, as reflected in patient instructions    Estimated body mass index is 38.28 kg/m  as calculated from the following:    Height as of 8/5/20: 1.524 m (5').    Weight as of this encounter: 88.9 kg (196 lb).    Weight management plan: Discussed healthy diet and exercise guidelines    She reports that she has never smoked. She has never used smokeless tobacco.      Counseling Resources:  ATP IV Guidelines  Pooled Cohorts Equation Calculator  Breast Cancer Risk Calculator  BRCA-Related Cancer Risk Assessment: FHS-7 Tool  FRAX Risk Assessment  ICSI Preventive Guidelines  Dietary Guidelines for Americans, 2010  USDA's MyPlate  ASA Prophylaxis  Lung CA Screening    AYDEE Gill CNP  [facility]

## 2021-05-11 LAB
DEPRECATED CALCIDIOL+CALCIFEROL SERPL-MC: 28 UG/L (ref 20–75)
HBV SURFACE AB SERPL IA-ACNC: 0.23 M[IU]/ML
HCV AB SERPL QL IA: NONREACTIVE
MEV IGG SER QL IA: 4.1 AI (ref 0–0.8)
MUV IGG SER QL IA: 3 AI (ref 0–0.8)
RUBV IGG SERPL IA-ACNC: 16 IU/ML
VZV IGG SER QL IA: 2.4 AI (ref 0–0.8)

## 2021-05-12 LAB
GAMMA INTERFERON BACKGROUND BLD IA-ACNC: 0 IU/ML
M TB IFN-G CD4+ BCKGRND COR BLD-ACNC: 9.75 IU/ML
M TB TUBERC IFN-G BLD QL: NEGATIVE
MITOGEN IGNF BCKGRD COR BLD-ACNC: 0 IU/ML
MITOGEN IGNF BCKGRD COR BLD-ACNC: 0 IU/ML

## 2021-05-20 ENCOUNTER — TELEPHONE (OUTPATIENT)
Dept: FAMILY MEDICINE | Facility: CLINIC | Age: 21
End: 2021-05-20

## 2021-05-20 NOTE — TELEPHONE ENCOUNTER
Incoming call received from pt inquiring if she needs additional Tdap immunizations to complete her series. 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  RN advised that her last Tdap was given on 5/10/21, so she would be due again in 10 years, or 5 years if there is a high-risk injury.    Pt states she knows how to log into her [employer] account so she will log in and print her current immunization record.    ANNIE BacaN, RN

## 2021-06-03 ENCOUNTER — VIRTUAL VISIT (OUTPATIENT)
Dept: PSYCHOLOGY | Facility: CLINIC | Age: 21
End: 2021-06-03
Payer: COMMERCIAL

## 2021-06-03 DIAGNOSIS — F41.1 GENERALIZED ANXIETY DISORDER: Primary | ICD-10-CM

## 2021-06-03 PROCEDURE — 90834 PSYTX W PT 45 MINUTES: CPT | Mod: 95 | Performed by: COUNSELOR

## 2021-06-07 NOTE — PROGRESS NOTES
Progress Note    Patient Name: Ana Joshi  Date: 6/3/2021         Service Type: Individual      Session Start Time: 2:00pm  Session End Time: 2:45pm     Session Length: 45minutes    Session #: 11    Attendees: Client attended alone    Telemedicine Visit: The patient's condition can be safely assessed and treated via synchronous audio and visual telemedicine encounter.      Reason for Telemedicine Visit: Services only offered telehealth    Originating Site (Patient Location): Patient's home    Distant Site (Provider Location): Provider Remote Setting    Consent:  The patient/guardian has verbally consented to: the potential risks and benefits of telemedicine (video visit) versus in person care; bill my insurance or make self-payment for services provided; and responsibility for payment of non-covered services.     Mode of Communication:  Video Conference via [employer]    As the provider I attest to compliance with applicable laws and regulations related to telemedicine.     Treatment Plan Last Reviewed: 3/15/2021  PHQ-9 / DAGO-7 :     DATA  Interactive Complexity: No  Crisis: No       Progress Since Last Session (Related to Symptoms / Goals / Homework):   Symptoms: Improving starting to notice positive improvements in her relationships with others .     Homework: Partially completed      Episode of Care Goals: Minimal progress - CONTEMPLATION (Considering change and yet undecided); Intervened by assessing the negative and positive thinking (ambivalence) about behavior change     Current / Ongoing Stressors and Concerns:  Moved into a new apartment with friend and another roommate. Starting to stress about finances and how she is going to pay for living in apartment with new roommate. Looking into getting a new job or a second job in order to decrease her stress and anxiety.     Treatment Objective(s) Addressed in This Session:   identify 1 strategies to more  effectively address stressors  learn & utilize at least 2 assertive communication skills weekly      Intervention:  CBT: Looking at different stressors that she is encountering with the school year, finances, and housing situations. Looking at ways that she can work on opening communication with others to express her frustrations and experiences adding to her stress. Looking at adding tools to her coping skills toolbox.     ASSESSMENT: Current Emotional / Mental Status (status of significant symptoms):   Risk status (Self / Other harm or suicidal ideation)   Patient denies current fears or concerns for personal safety.   Patient denies current or recent suicidal ideation or behaviors.   Patient denies current or recent homicidal ideation or behaviors.   Patient denies current or recent self injurious behavior or ideation.   Patient denies other safety concerns.   Patient reports there has been no change in risk factors since their last session.     Patient reports there has been no change in protective factors since their last session.     Recommended that patient call 911 or go to the local ED should there be a change in any of these risk factors.     Appearance:   could not assess, phone session    Eye Contact:   could not assess, phone session    Psychomotor Behavior: could not assess, phone session    Attitude:   Cooperative    Orientation:   All   Speech    Rate / Production: Normal/ Responsive Normal     Volume:  Normal    Mood:    Anxious    Affect:    Appropriate    Thought Content:  Clear    Thought Form:  Coherent  Logical    Insight:    Fair      Medication Review:   No changes to current psychiatric medication(s)     Medication Compliance:   Yes     Changes in Health Issues:   None reported     Chemical Use Review:   Substance Use: Chemical use reviewed, no active concerns identified      Tobacco Use: No current tobacco use.      Diagnosis:  1. Generalized anxiety disorder        Collateral Reports  Completed:   Not Applicable    PLAN: (Patient Tasks / Therapist Tasks / Other)  Continue with individual therapy. Practice coping/calming techniques learned in session       Sydnie Elliott LPC                                                     Treatment Plan    Client's Name: Ana Joshi  YOB: 2000    Date: 3/15/2021    DSM-V Diagnoses: 300.02 (F41.1) Generalized Anxiety Disorder  Psychosocial / Contextual Factors: social stress, financial stress, school stress  WHODAS:     Referral / Collaboration:  Referral to another professional/service is not indicated at this time..    Anticipated number of session or this episode of care: 15-20      MeasurableTreatment Goal(s) related to diagnosis / functional impairment(s)  Goal 1: Client will report a decrease anxiety symptoms.    Objective #A (Client Action)    Client will use thought-stopping strategy daily to reduce intrusive thoughts.  Status: New - Date: 3/15/2021     Intervention(s)  Therapist will teach though stopping techniques.    Objective #B  Client will identify at least 3 triggers for anxiety.  Status: New - Date: 3/15/2021     Intervention(s)  Therapist will teach emotional recognition/identification.  .    Objective #C  Client will use cognitive strategies identified in therapy to challenge anxious thoughts.  Status: New - Date: 3/15/2021     Intervention(s)  Therapist will teach CBT skills.      Goal 2: Client will report improvements in interpersonal relationship struggles.    Objective #A (Client Action)    Status: New - Date: 3/15/2021     Client will learn & utilize at least 3 assertive communication skills weekly.    Intervention(s)  Therapist will role-play effective communication skills.    Objective #B  Client will learn & utilize at least 3 assertive communication skills weekly.    Status: New - Date: 3/15/2021     Intervention(s)  Therapist will role-play conflict management.    Objective #C  Client will compile a list  of boundaries that they would like to set with others. Peers.  Status: New - Date: 3/15/2021     Intervention(s)  Therapist will teach about healthy boundaries.  .    Patient has reviewed and agreed to the above plan.      Sydnie Elliott, СЕРГЕЙ  June 7, 2021

## 2021-07-12 ENCOUNTER — MYC MEDICAL ADVICE (OUTPATIENT)
Dept: FAMILY MEDICINE | Facility: CLINIC | Age: 21
End: 2021-07-12

## 2021-07-13 ENCOUNTER — VIRTUAL VISIT (OUTPATIENT)
Dept: PSYCHOLOGY | Facility: CLINIC | Age: 21
End: 2021-07-13
Payer: COMMERCIAL

## 2021-07-13 DIAGNOSIS — F41.1 GENERALIZED ANXIETY DISORDER: Primary | ICD-10-CM

## 2021-07-13 PROCEDURE — 90834 PSYTX W PT 45 MINUTES: CPT | Mod: 95 | Performed by: COUNSELOR

## 2021-07-14 NOTE — PROGRESS NOTES
Progress Note    Patient Name: Ana Joshi  Date: 7/13/2021         Service Type: Individual      Session Start Time: 5:00pm  Session End Time: 5:50pm     Session Length: 50 minutes    Session #: 12    Attendees: Client attended alone    Telemedicine Visit: The patient's condition can be safely assessed and treated via synchronous audio and visual telemedicine encounter.      Reason for Telemedicine Visit: Services only offered telehealth    Originating Site (Patient Location): Patient's home    Distant Site (Provider Location): Provider Remote Setting    Consent:  The patient/guardian has verbally consented to: the potential risks and benefits of telemedicine (video visit) versus in person care; bill my insurance or make self-payment for services provided; and responsibility for payment of non-covered services.     Mode of Communication:  Video Conference via [employer]    As the provider I attest to compliance with applicable laws and regulations related to telemedicine.     Treatment Plan Last Reviewed: 7/13/2021  PHQ-9 / DAGO-7 :     DATA  Interactive Complexity: No  Crisis: No       Progress Since Last Session (Related to Symptoms / Goals / Homework):   Symptoms: Improving starting to notice positive improvements in her relationships with others .     Homework: Partially completed      Episode of Care Goals: Minimal progress - CONTEMPLATION (Considering change and yet undecided); Intervened by assessing the negative and positive thinking (ambivalence) about behavior change     Current / Ongoing Stressors and Concerns:  Starting to question her relationship with boyfriend's mental health. Boyfriend is starting to cause some disruption in day to day living and resulting in Ana asking whether this is a relationship she wants to remain in.     Treatment Objective(s) Addressed in This Session:   identify 1 strategies to more effectively address stressors  learn  & utilize at least 2 assertive communication skills weekly      Intervention:  CBT: Chaining events and conversations with boyfriend and ways that she can continue to work on setting limits and expectations with him without causing more stress or strain on their relationship. Exploring ways that she can support him without feeling that she is his therapist.     ASSESSMENT: Current Emotional / Mental Status (status of significant symptoms):   Risk status (Self / Other harm or suicidal ideation)   Patient denies current fears or concerns for personal safety.   Patient denies current or recent suicidal ideation or behaviors.   Patient denies current or recent homicidal ideation or behaviors.   Patient denies current or recent self injurious behavior or ideation.   Patient denies other safety concerns.   Patient reports there has been no change in risk factors since their last session.     Patient reports there has been no change in protective factors since their last session.     Recommended that patient call 911 or go to the local ED should there be a change in any of these risk factors.     Appearance:   could not assess, phone session    Eye Contact:   could not assess, phone session    Psychomotor Behavior: could not assess, phone session    Attitude:   Cooperative    Orientation:   All   Speech    Rate / Production: Normal/ Responsive Normal     Volume:  Normal    Mood:    Anxious    Affect:    Appropriate    Thought Content:  Clear    Thought Form:  Coherent  Logical    Insight:    Fair      Medication Review:   No changes to current psychiatric medication(s)     Medication Compliance:   Yes     Changes in Health Issues:   None reported     Chemical Use Review:   Substance Use: Chemical use reviewed, no active concerns identified      Tobacco Use: No current tobacco use.      Diagnosis:  1. Generalized anxiety disorder        Collateral Reports Completed:   Not Applicable    PLAN: (Patient Tasks / Therapist Tasks  / Other)  Continue with individual therapy. Practice communication  techniques learned in session       Sydnie Elliott LPC                                                     Treatment Plan    Client's Name: Ana Joshi  YOB: 2000    Date: 7/13/2021    DSM-V Diagnoses: 300.02 (F41.1) Generalized Anxiety Disorder  Psychosocial / Contextual Factors: social stress, financial stress, school stress  WHODAS:     Referral / Collaboration:  Referral to another professional/service is not indicated at this time..    Anticipated number of session or this episode of care: 15-20      MeasurableTreatment Goal(s) related to diagnosis / functional impairment(s)  Goal 1: Client will report a decrease anxiety symptoms.    Objective #A (Client Action)    Client will use thought-stopping strategy daily to reduce intrusive thoughts.  Status: Continued - Date(s):7/13/2021     Intervention(s)  Therapist will teach though stopping techniques.    Objective #B  Client will identify at least 3 triggers for anxiety.  Status: Continued - Date(s):7/13/2021     Intervention(s)  Therapist will teach emotional recognition/identification.  .    Objective #C  Client will use cognitive strategies identified in therapy to challenge anxious thoughts.  Status: Continued - Date(s):7/13/2021     Intervention(s)  Therapist will teach CBT skills.      Goal 2: Client will report improvements in interpersonal relationship struggles.    Objective #A (Client Action)    Status: Continued - Date(s):7/13/2021     Client will learn & utilize at least 3 assertive communication skills weekly.    Intervention(s)  Therapist will role-play effective communication skills.    Objective #B  Client will learn & utilize at least 3 assertive communication skills weekly.    Status: Continued - Date(s):7/13/2021     Intervention(s)  Therapist will role-play conflict management.    Objective #C  Client will compile a list of boundaries that they would  like to set with others. Peers.  Status: Continued - Date(s):7/13/2021     Intervention(s)  Therapist will teach about healthy boundaries.  .    Patient has reviewed and agreed to the above plan.      Sydnie lEliott, СЕРГЕЙ  July 14, 2021

## 2021-07-19 NOTE — TELEPHONE ENCOUNTER
Patient is calling. She said that she had a physical with Elaine Beckwith 5/10/21. It was scheduled as that. Patient is supposed to start today and needs this.    5/10/2021 11:26 AM    Old Appointment:  New Appointment:    Visit Type: LONG [659]  Provider: Elaine Beckwith APRN CNP [[id number]]  Dept: [facility] [[id number]]  Date: 5/10/21  Arrival Time: 11:20 AM  Time: 11:20 AM  Length: 20   Visit Type: PHYSICAL [122]     Please advise.Dorina De Luna

## 2021-07-19 NOTE — TELEPHONE ENCOUNTER
Received form. Copy to TC and abstracting. Faxed form to Odilia Elizabeth, [phone], right fax confirmed at 12:21 pm today, 7/19/2021  Sent a My Chart message regarding this and Samantha's message.  Jyoti Bennett MA  [facility]  2nd Floor  Primary Care

## 2021-07-19 NOTE — TELEPHONE ENCOUNTER
Pls call patient and let her know we can either fax the letter to her (need fax number) of she can pick it up at the .  Gladys BAJWA, CNP

## 2021-07-19 NOTE — TELEPHONE ENCOUNTER
She was not billed for a physical in May but I see that she had a complete exam so will complete the form. Last physical was 8/5/20.    Gladys BAJWA, CNP

## 2021-07-19 NOTE — TELEPHONE ENCOUNTER
Mom and daughter is calling. Mom is very upset that it has been over 1 week since , and form is not done. Mom said this needs to be done today. She is supposed to start her job today, and she cannot start without her form. She said she should not have to come in again, she was in in May for a physical. Insurance will not pay for another one. Patient's mom wants Gladys Elliott to call Ana directly to get this worked out.Dorina De Luna

## 2021-08-05 ENCOUNTER — MYC MEDICAL ADVICE (OUTPATIENT)
Dept: FAMILY MEDICINE | Facility: CLINIC | Age: 21
End: 2021-08-05

## 2021-08-05 DIAGNOSIS — F32.1 MODERATE SINGLE CURRENT EPISODE OF MAJOR DEPRESSIVE DISORDER (H): ICD-10-CM

## 2021-08-05 DIAGNOSIS — Z30.41 ENCOUNTER FOR SURVEILLANCE OF CONTRACEPTIVE PILLS: ICD-10-CM

## 2021-08-10 RX ORDER — NORETHINDRONE AND ETHINYL ESTRADIOL 1 MG-35MCG
1 KIT ORAL DAILY
Qty: 84 TABLET | Refills: 0 | Status: SHIPPED | OUTPATIENT
Start: 2021-08-10 | End: 2021-11-01

## 2021-08-10 NOTE — TELEPHONE ENCOUNTER
[facility] did not receive the Rx for      norethindrone-ethinyl estradiol (ALAYCEN 1/35) 1-35 MG-MCG tablet 84 tablet     This Rx was resent for the remaining fill of 84 tablets.    Cassandra Gruber RN, [employer]

## 2021-08-10 NOTE — TELEPHONE ENCOUNTER
Patient calling back  Updated patient that the pharmacy should have refills on the Fluoxetine and that the RN had resent the prescription for the oral contraceptive pills  Patient verbalized understanding.    Ethel Salazar RN  [facility]

## 2021-09-26 ENCOUNTER — HEALTH MAINTENANCE LETTER (OUTPATIENT)
Age: 21
End: 2021-09-26

## 2021-10-30 DIAGNOSIS — Z30.41 ENCOUNTER FOR SURVEILLANCE OF CONTRACEPTIVE PILLS: ICD-10-CM

## 2021-11-01 RX ORDER — NORETHINDRONE AND ETHINYL ESTRADIOL 1 MG-35MCG
KIT ORAL
Qty: 84 TABLET | Refills: 0 | Status: SHIPPED | OUTPATIENT
Start: 2021-11-01 | End: 2022-01-16

## 2021-12-17 NOTE — TELEPHONE ENCOUNTER
"Requested Prescriptions   Pending Prescriptions Disp Refills     ALAYCEN 1/35 1-35 MG-MCG tablet [Pharmacy Med Name: ALYACEN 1-35 28 TABLET] 84 tablet 0     Sig: TAKE 1 TABLET BY MOUTH EVERY DAY         Last Written Prescription Date:  8/9/18  Last Fill Quantity: 84,  # refills: 3   Last Office Visit with [facility], [facility] or [facility] prescribing provider:  5/31/19   Future Office Visit:         Contraceptives Protocol Passed - 7/11/2019 10:38 AM        Passed - Patient is not a current smoker if age is 35 or older        Passed - Recent (12 mo) or future (30 days) visit within the authorizing provider's specialty     Patient had office visit in the last 12 months or has a visit in the next 30 days with authorizing provider or within the authorizing provider's specialty.  See \"Patient Info\" tab in inbasket, or \"Choose Columns\" in Meds & Orders section of the refill encounter.              Passed - Medication is active on med list        Passed - No active pregnancy on record        Passed - No positive pregnancy test in past 12 months              Trevon Faarax  [facility]  "
Prescription approved per [facility] Refill Protocol.  Lyn Mcbride RN    
Auditory hallucinations/Other

## 2022-01-06 ENCOUNTER — LAB REQUISITION (OUTPATIENT)
Dept: LAB | Facility: CLINIC | Age: 22
End: 2022-01-06
Payer: COMMERCIAL

## 2022-01-06 DIAGNOSIS — U07.1 COVID-19: ICD-10-CM

## 2022-01-07 PROCEDURE — U0003 INFECTIOUS AGENT DETECTION BY NUCLEIC ACID (DNA OR RNA); SEVERE ACUTE RESPIRATORY SYNDROME CORONAVIRUS 2 (SARS-COV-2) (CORONAVIRUS DISEASE [COVID-19]), AMPLIFIED PROBE TECHNIQUE, MAKING USE OF HIGH THROUGHPUT TECHNOLOGIES AS DESCRIBED BY CMS-2020-01-R: HCPCS | Mod: ORL | Performed by: INTERNAL MEDICINE

## 2022-01-08 ENCOUNTER — LAB REQUISITION (OUTPATIENT)
Dept: LAB | Facility: CLINIC | Age: 22
End: 2022-01-08
Payer: COMMERCIAL

## 2022-01-08 DIAGNOSIS — U07.1 COVID-19: ICD-10-CM

## 2022-01-09 LAB — SARS-COV-2 RNA RESP QL NAA+PROBE: NEGATIVE

## 2022-01-20 ENCOUNTER — VIRTUAL VISIT (OUTPATIENT)
Dept: FAMILY MEDICINE | Facility: CLINIC | Age: 22
End: 2022-01-20
Payer: COMMERCIAL

## 2022-01-20 DIAGNOSIS — F32.1 MODERATE SINGLE CURRENT EPISODE OF MAJOR DEPRESSIVE DISORDER (H): Primary | ICD-10-CM

## 2022-01-20 DIAGNOSIS — F41.1 GAD (GENERALIZED ANXIETY DISORDER): ICD-10-CM

## 2022-01-20 DIAGNOSIS — Z11.3 ROUTINE SCREENING FOR STI (SEXUALLY TRANSMITTED INFECTION): ICD-10-CM

## 2022-01-20 PROCEDURE — 99214 OFFICE O/P EST MOD 30 MIN: CPT | Mod: 95 | Performed by: NURSE PRACTITIONER

## 2022-01-20 RX ORDER — FLUOXETINE 40 MG/1
CAPSULE ORAL
Qty: 90 CAPSULE | Refills: 1 | Status: SHIPPED | OUTPATIENT
Start: 2022-01-20

## 2022-01-20 ASSESSMENT — ANXIETY QUESTIONNAIRES
IF YOU CHECKED OFF ANY PROBLEMS ON THIS QUESTIONNAIRE, HOW DIFFICULT HAVE THESE PROBLEMS MADE IT FOR YOU TO DO YOUR WORK, TAKE CARE OF THINGS AT HOME, OR GET ALONG WITH OTHER PEOPLE: VERY DIFFICULT
5. BEING SO RESTLESS THAT IT IS HARD TO SIT STILL: SEVERAL DAYS
1. FEELING NERVOUS, ANXIOUS, OR ON EDGE: MORE THAN HALF THE DAYS
4. TROUBLE RELAXING: MORE THAN HALF THE DAYS
GAD7 TOTAL SCORE: 13
2. NOT BEING ABLE TO STOP OR CONTROL WORRYING: NEARLY EVERY DAY
3. WORRYING TOO MUCH ABOUT DIFFERENT THINGS: NEARLY EVERY DAY
6. BECOMING EASILY ANNOYED OR IRRITABLE: SEVERAL DAYS
7. FEELING AFRAID AS IF SOMETHING AWFUL MIGHT HAPPEN: SEVERAL DAYS

## 2022-01-20 ASSESSMENT — PATIENT HEALTH QUESTIONNAIRE - PHQ9: SUM OF ALL RESPONSES TO PHQ QUESTIONS 1-9: 2

## 2022-01-20 NOTE — PROGRESS NOTES
Ana is a 21 year old who is being evaluated via a billable video visit.      How would you like to obtain your AVS? MyChart  If the video visit is dropped, the invitation should be resent by: Text to cell phone: .  Will anyone else be joining your video visit? No    Video Start Time: 1:33    Assessment & Plan       Moderate single current episode of major depressive disorder (H)  Increasing Prozac to 60 mg daily, follow back 1 month, reminded that it will take 4-6 weeks to reach steady state, she is getting back in with counseling at her school ([employer]), reviewed emergency contact information.  - FLUoxetine (PROZAC) 20 MG capsule  Dispense: 90 capsule; Refill: 1  - FLUoxetine (PROZAC) 40 MG capsule  Dispense: 90 capsule; Refill: 1    DAGO (generalized anxiety disorder)  Increasing Prozac to 60 mg daily, get back in with counseling, follow back virtually in  Month.  - FLUoxetine (PROZAC) 20 MG capsule  Dispense: 90 capsule; Refill: 1    Routine screening for STI (sexually transmitted infection)    - NEISSERIA GONORRHOEA PCR  - CHLAMYDIA TRACHOMATIS PCR        Ordering of each unique test  Prescription drug management  21 minutes spent on the date of the encounter doing chart review, history and exam, documentation and further activities per the note       Work on weight loss  Regular exercise  See Patient Instructions    No follow-ups on file.    AYDEE Jimenez [facility]    Brian Perez is a 21 year old who presents for the following health issues   HPI     Depression and Anxiety Follow-Up    How are you doing with your depression since your last visit? Improved     How are you doing with your anxiety since your last visit?  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 Yes:  .    Have you had a significant life event? Relationship Concerns     Do  you have any concerns with your use of alcohol or other drugs? Yes:  .    Social History     Tobacco Use     Smoking status: Never Smoker     Smokeless tobacco: Never Used   Substance Use Topics     Alcohol use: No     Drug use: No     PHQ 5/20/2020 1/25/2021 1/20/2022   PHQ-9 Total Score 3 3 2   Q9: Thoughts of better off dead/self-harm past 2 weeks Not at all Not at all Not at all     DAGO-7 SCORE 5/20/2020 1/25/2021 1/20/2022   Total Score 2 6 13     Last PHQ-9 1/20/2022   1.  Little interest or pleasure in doing things 0   2.  Feeling down, depressed, or hopeless 0   3.  Trouble falling or staying asleep, or sleeping too much 0   4.  Feeling tired or having little energy 1   5.  Poor appetite or overeating 1   6.  Feeling bad about yourself 0   7.  Trouble concentrating 0   8.  Moving slowly or restless 0   Q9: Thoughts of better off dead/self-harm past 2 weeks 0   PHQ-9 Total Score 2   Difficulty at work, home, or with people Not difficult at all     DAGO-7  1/20/2022   1. Feeling nervous, anxious, or on edge 2   2. Not being able to stop or control worrying 3   3. Worrying too much about different things 3   4. Trouble relaxing 2   5. Being so restless that it is hard to sit still 1   6. Becoming easily annoyed or irritable 1   7. Feeling afraid, as if something awful might happen 1   DAGO-7 Total Score 13   If you checked any problems, how difficult have they made it for you to do your work, take care of things at home, or get along with other people? Very difficult       Suicide Assessment Five-step Evaluation and Treatment (SAFE-T)      How many servings of fruits and vegetables do you eat daily?  2-3    On average, how many sweetened beverages do you drink each day (Examples: soda, juice, sweet tea, etc.  Do NOT count diet or artificially sweetened beverages)?   1    How many days per week do you exercise enough to make your heart beat faster? 4    How many minutes a day do you exercise enough to make your  heart beat faster? 20 - 29    How many days per week do you miss taking your medication? 0    She also requests refill of her birth control pills, is now in a new relationship, no STI history, using condoms but due for GC screening. No issues with medication compliance.        Review of Systems   Constitutional, HEENT, cardiovascular, pulmonary, gi and gu systems are negative, except as otherwise noted.      Objective           Vitals:  No vitals were obtained today due to virtual visit.    Physical Exam   GENERAL: Healthy, alert and no distress  EYES: Eyes grossly normal to inspection.  No discharge or erythema, or obvious scleral/conjunctival abnormalities.  HENT: Normal cephalic/atraumatic.  External ears, nose and mouth without ulcers or lesions.  No nasal drainage visible.  NECK: No asymmetry, visible masses or scars  RESP: No audible wheeze, cough, or visible cyanosis.  No visible retractions or increased work of breathing.    SKIN: Visible skin clear. No significant rash, abnormal pigmentation or lesions.  NEURO: Cranial nerves grossly intact.  Mentation and speech appropriate for age.  PSYCH: Mentation appears normal, affect normal/bright, judgement and insight intact, normal speech and appearance well-groomed.        Video-Visit Details    Type of service:  Video Visit    Video End Time:1:55    Originating Location (pt. Location): Home    Distant Location (provider location):  [facility]     Platform used for Video Visit: Super[employer]

## 2022-01-21 ASSESSMENT — ANXIETY QUESTIONNAIRES: GAD7 TOTAL SCORE: 13

## 2022-02-03 ENCOUNTER — E-VISIT (OUTPATIENT)
Dept: URGENT CARE | Facility: CLINIC | Age: 22
End: 2022-02-03
Payer: COMMERCIAL

## 2022-02-03 DIAGNOSIS — N39.0 ACUTE UTI (URINARY TRACT INFECTION): Primary | ICD-10-CM

## 2022-02-03 PROCEDURE — 99421 OL DIG E/M SVC 5-10 MIN: CPT | Performed by: NURSE PRACTITIONER

## 2022-02-03 RX ORDER — NITROFURANTOIN 25; 75 MG/1; MG/1
100 CAPSULE ORAL 2 TIMES DAILY
Qty: 10 CAPSULE | Refills: 0 | Status: SHIPPED | OUTPATIENT
Start: 2022-02-03 | End: 2022-02-08

## 2022-02-04 NOTE — PATIENT INSTRUCTIONS
Dear Ana Joshi    After reviewing your responses, I've been able to diagnose you with a urinary tract infection, which is a common infection of the bladder with bacteria.  This is not a sexually transmitted infection, though urinating immediately after intercourse can help prevent infections.  Drinking lots of fluids is also helpful to clear your current infection and prevent the next one.      I have sent a prescription for antibiotics to your pharmacy to treat this infection.    It is important that you take all of your prescribed medication even if your symptoms are improving after a few doses.  Taking all of your medicine helps prevent the symptoms from returning.     If your symptoms worsen, you develop pain in your back or stomach, develop fevers, or are not improving in 5 days, please contact your primary care provider for an appointment or visit any of our convenient Walk-in or Urgent Care Centers to be seen, which can be found on our website here.    Thanks again for choosing us as your health care partner,    Kriss Kaye CNP    Urinary Tract Infections in Women  Urinary tract infections (UTIs) are most often caused by bacteria. These bacteria enter the urinary tract. The bacteria may come from inside the body. Or they may travel from the skin outside the rectum or vagina into the urethra. Female anatomy makes it easy for bacteria from the bowel to enter a woman s urinary tract, which is the most common source of UTI. This means women develop UTIs more often than men. Pain in or around the urinary tract is a common UTI symptom. But the only way to know for sure if you have a UTI for the healthcare provider to test your urine. The two tests that may be done are the urinalysis and urine culture.     Types of UTIs    Cystitis. A bladder infection (cystitis) is the most common UTI in women. You may have urgent or frequent need to pee. You may also have pain, burning when you pee, and bloody  urine.    Urethritis. This is an inflamed urethra, which is the tube that carries urine from the bladder to outside the body. You may have lower stomach or back pain. You may also have urgent or frequent need to pee.    Pyelonephritis. This is a kidney infection. If not treated, it can be serious and damage your kidneys. In severe cases, you may need to stay in the hospital. You may have a fever and lower back pain.    Medicines to treat a UTI  Most UTIs are treated with antibiotics. These kill the bacteria. The length of time you need to take them depends on the type of infection. It may be as short as 3 days. If you have repeated UTIs, you may need a low-dose antibiotic for several months. Take antibiotics exactly as directed. Don t stop taking them until all of the medicine is gone. If you stop taking the antibiotic too soon, the infection may not go away. You may also develop a resistance to the antibiotic. This can make it much harder to treat.   Lifestyle changes to treat and prevent UTIs   The lifestyle changes below will help get rid of your UTI. They may also help prevent future UTIs.     Drink plenty of fluids. This includes water, juice, or other caffeine-free drinks. Fluids help flush bacteria out of your body.    Empty your bladder. Always empty your bladder when you feel the urge to pee. And always pee before going to sleep. Urine that stays in your bladder can lead to infection. Try to pee before and after sex as well.    Practice good personal hygiene. Wipe yourself from front to back after using the toilet. This helps keep bacteria from getting into the urethra.    Use condoms during sex. These help prevent UTIs caused by sexually transmitted bacteria. Also don't use spermicides during sex. These can increase the risk for UTIs. Choose other forms of birth control instead. For women who tend to get UTIs after sex, a low-dose of a preventive antibiotic may be used. Be sure to discuss this option with  your healthcare provider.    Follow up with your healthcare provider as directed. He or she may test to make sure the infection has cleared. If needed, more treatment may be started.  Micky last reviewed this educational content on 7/1/2019 2000-2021 [employer]. All rights reserved. This information is not intended as a substitute for professional medical care. Always follow your healthcare professional's instructions.

## 2022-02-07 ENCOUNTER — VIRTUAL VISIT (OUTPATIENT)
Dept: PSYCHOLOGY | Facility: CLINIC | Age: 22
End: 2022-02-07
Payer: COMMERCIAL

## 2022-02-07 DIAGNOSIS — F41.1 GENERALIZED ANXIETY DISORDER: Primary | ICD-10-CM

## 2022-02-07 PROCEDURE — 90834 PSYTX W PT 45 MINUTES: CPT | Mod: 95 | Performed by: COUNSELOR

## 2022-02-07 ASSESSMENT — ANXIETY QUESTIONNAIRES
3. WORRYING TOO MUCH ABOUT DIFFERENT THINGS: SEVERAL DAYS
5. BEING SO RESTLESS THAT IT IS HARD TO SIT STILL: SEVERAL DAYS
GAD7 TOTAL SCORE: 3
1. FEELING NERVOUS, ANXIOUS, OR ON EDGE: NOT AT ALL
2. NOT BEING ABLE TO STOP OR CONTROL WORRYING: SEVERAL DAYS
7. FEELING AFRAID AS IF SOMETHING AWFUL MIGHT HAPPEN: NOT AT ALL
4. TROUBLE RELAXING: NOT AT ALL
7. FEELING AFRAID AS IF SOMETHING AWFUL MIGHT HAPPEN: NOT AT ALL
6. BECOMING EASILY ANNOYED OR IRRITABLE: NOT AT ALL
GAD7 TOTAL SCORE: 3
GAD7 TOTAL SCORE: 3

## 2022-02-07 ASSESSMENT — PATIENT HEALTH QUESTIONNAIRE - PHQ9
SUM OF ALL RESPONSES TO PHQ QUESTIONS 1-9: 3
10. IF YOU CHECKED OFF ANY PROBLEMS, HOW DIFFICULT HAVE THESE PROBLEMS MADE IT FOR YOU TO DO YOUR WORK, TAKE CARE OF THINGS AT HOME, OR GET ALONG WITH OTHER PEOPLE: SOMEWHAT DIFFICULT
SUM OF ALL RESPONSES TO PHQ QUESTIONS 1-9: 3

## 2022-02-08 ASSESSMENT — PATIENT HEALTH QUESTIONNAIRE - PHQ9: SUM OF ALL RESPONSES TO PHQ QUESTIONS 1-9: 3

## 2022-02-08 ASSESSMENT — ANXIETY QUESTIONNAIRES: GAD7 TOTAL SCORE: 3

## 2022-02-11 ENCOUNTER — E-VISIT (OUTPATIENT)
Dept: URGENT CARE | Facility: CLINIC | Age: 22
End: 2022-02-11
Payer: COMMERCIAL

## 2022-02-11 DIAGNOSIS — R12 HEARTBURN: Primary | ICD-10-CM

## 2022-02-11 PROCEDURE — 99421 OL DIG E/M SVC 5-10 MIN: CPT | Performed by: PHYSICIAN ASSISTANT

## 2022-02-11 RX ORDER — LANSOPRAZOLE 30 MG/1
30 CAPSULE, DELAYED RELEASE ORAL
Qty: 30 CAPSULE | Refills: 2 | Status: SHIPPED | OUTPATIENT
Start: 2022-02-11

## 2022-02-11 RX ORDER — SUCRALFATE 1 G/1
1 TABLET ORAL 4 TIMES DAILY PRN
Qty: 40 TABLET | Refills: 1 | Status: SHIPPED | OUTPATIENT
Start: 2022-02-11

## 2022-02-12 NOTE — PATIENT INSTRUCTIONS
Lifestyle Changes for Controlling GERD  When you have GERD, stomach acid feels as if it s backing up toward your mouth. Making lifestyle changes can often improve your symptoms. This is true if you take medicine to control your GERD or not. Talk with your healthcare provider about the following suggestions. They may help you get relief from your symptoms.  Raise your head    Reflux is more likely to happen when you re lying down flat. That's because stomach fluid can flow backward more easily. Raising the head of your bed 4 to 6 inches can help. To do this:    Slide blocks or books under the legs at the head of your bed. Or put a wedge under the mattress. Many [employer] can make a wedge for you. The wedge should go from your waist to the top of your head.    Don t just prop your head up on a few pillows. This increases pressure on your stomach. It can make GERD worse.  Watch your eating habits  Certain foods may increase the acid in your stomach. Or they may relax the lower esophageal sphincter. This makes GERD more likely. It s best to avoid the following if they cause you symptoms:    Coffee, tea, and carbonated drinks (with and without caffeine)    Fatty, fried, or spicy food    Mint, chocolate, onions, tomatoes, and citrus    Peppermint    Any other foods that seem to irritate your stomach or cause you pain  Relieve the pressure  Tips include the following:    Eat smaller meals, even if you have to eat more often.    Don t lie down right after you eat. Wait a few hours for your stomach to empty.    Don't wear tight belts or tight-fitting clothes.    Lose any extra weight.  Tobacco and alcohol  Don't smoke tobacco or drink alcohol. They can make GERD symptoms worse.  [employer] last reviewed this educational content on 6/1/2019 2000-2021 [employer]. All rights reserved. This information is not intended as a substitute for professional medical care. Always follow your healthcare  professional's instructions.

## 2022-02-13 NOTE — PROGRESS NOTES
Progress Note    Patient Name: Ana Jsohi  Date: 2/7/2022         Service Type: Individual      Session Start Time: 10:00am  Session End Time: 10:50am     Session Length: 50 minutes    Session #: 13    Attendees: Client attended alone    Telemedicine Visit: The patient's condition can be safely assessed and treated via synchronous audio and visual telemedicine encounter.      Reason for Telemedicine Visit: Services only offered telehealth    Originating Site (Patient Location): Patient's home    Distant Site (Provider Location): Provider Remote Setting    Consent:  The patient/guardian has verbally consented to: the potential risks and benefits of telemedicine (video visit) versus in person care; bill my insurance or make self-payment for services provided; and responsibility for payment of non-covered services.     Mode of Communication:  Video Conference via [employer]    As the provider I attest to compliance with applicable laws and regulations related to telemedicine.     Treatment Plan Last Reviewed: 2/7/2022  PHQ-9 / DAGO-7 :     DATA  Interactive Complexity: No  Crisis: No       Progress Since Last Session (Related to Symptoms / Goals / Homework):   Symptoms: Worsening anxiety and depression have been increasing due to recent stressors. Restarting therapy.     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 Boyfriend ended their relationship and made hurtful statements about her weight and body that have become somwhat internalized. Working on  his statements from her self-image.     Treatment Objective(s) Addressed in This  Session:   identify 1 strategies to more effectively address stressors  learn & utilize at least 2 assertive communication skills weekly      Intervention:  CBT: Reviewing interactions and conversations with ex-boyfriend and how he has continued to play back-and-forth games with her about their relationship. Looking at ways that she has maintained boundaries with him. Also exploring future conversations that she feels she may have to tackle with him, since she is now in a new relationship.     ASSESSMENT: Current Emotional / Mental Status (status of significant symptoms):   Risk status (Self / Other harm or suicidal ideation)   Patient denies current fears or concerns for personal safety.   Patient denies current or recent suicidal ideation or behaviors.   Patient denies current or recent homicidal ideation or behaviors.   Patient denies current or recent self injurious behavior or ideation.   Patient denies other safety concerns.   Patient reports there has been no change in risk factors since their last session.     Patient reports there has been no change in protective factors since their last session.     Recommended that patient call 911 or go to the local ED should there be a change in any of these risk factors.     Appearance:   Appropriate    Eye Contact:   Good    Psychomotor Behavior: Normal    Attitude:   Cooperative    Orientation:   All   Speech    Rate / Production: Normal/ Responsive Normal     Volume:  Normal    Mood:    Anxious    Affect:    Subdued    Thought Content:  Clear    Thought Form:  Coherent  Logical    Insight:    Fair      Medication Review:   No changes to current psychiatric medication(s)     Medication Compliance:   Yes     Changes in Health Issues:   None reported     Chemical Use Review:   Substance Use: Chemical use reviewed, no active concerns identified      Tobacco Use: No current tobacco use.      Diagnosis:  1. Generalized anxiety disorder        Collateral Reports  Completed:   Not Applicable    PLAN: (Patient Tasks / Therapist Tasks / Other)  Continue with individual therapy. Continue working on boundaries with others and decreasing negative self-talk      Sydnie Elliott, [facility]                                                     Treatment Plan    Client's Name: Ana Joshi  YOB: 2000    Date: 2/7/2022    DSM-V Diagnoses: 300.02 (F41.1) Generalized Anxiety Disorder  Psychosocial / Contextual Factors: social stress, financial stress, school stress  WHODAS:     Referral / Collaboration:  Referral to another professional/service is not indicated at this time..    Anticipated number of session or this episode of care: 15-20      MeasurableTreatment Goal(s) related to diagnosis / functional impairment(s)  Goal 1: Client will report a decrease anxiety symptoms.    Objective #A (Client Action)    Client will use thought-stopping strategy daily to reduce intrusive thoughts.  Status: Restarted - Date: 2/7/2022      Intervention(s)  Therapist will teach though stopping techniques.    Objective #B  Client will identify at least 3 triggers for anxiety.  Status: Restarted - Date: 2/7/2022     Intervention(s)  Therapist will teach emotional recognition/identification.  .    Objective #C  Client will use cognitive strategies identified in therapy to challenge anxious thoughts.  Status: Restarted - Date: 2/7/2022      Intervention(s)  Therapist will teach CBT skills.      Goal 2: Client will report improvements in interpersonal relationship struggles.    Objective #A (Client Action)    Status: Restarted - Date: 2/7/2022     Client will learn & utilize at least 3 assertive communication skills weekly.    Intervention(s)  Therapist will role-play effective communication skills.    Objective #B  Client will learn & utilize at least 3 assertive communication skills weekly.    Status: Restarted - Date: 2/7/2022      Intervention(s)  Therapist will role-play conflict  management.    Objective #C  Client will compile a list of boundaries that they would like to set with others. Peers.  Status: Restarted - Date: 2/7/2022     Intervention(s)  Therapist will teach about healthy boundaries.  .    Patient has reviewed and agreed to the above plan.      Sydnie Elliott, СЕРГЕЙ  February 12, 2022  Answers for HPI/ROS submitted by the patient on 2/7/2022  If you checked off any problems, how difficult have these problems made it for you to do your work, take care of things at home, or get along with other people?: Somewhat difficult  PHQ9 TOTAL SCORE: 3  DAGO 7 TOTAL SCORE: 3

## 2022-02-15 ENCOUNTER — E-VISIT (OUTPATIENT)
Dept: URGENT CARE | Facility: CLINIC | Age: 22
End: 2022-02-15
Payer: COMMERCIAL

## 2022-02-15 DIAGNOSIS — R30.0 DIFFICULT OR PAINFUL URINATION: Primary | ICD-10-CM

## 2022-02-15 PROCEDURE — 99421 OL DIG E/M SVC 5-10 MIN: CPT | Performed by: PHYSICIAN ASSISTANT

## 2022-02-16 NOTE — PATIENT INSTRUCTIONS
Dear Ana Joshi,     After reviewing your responses, I would like you to come in for a urine test to make sure we treat you correctly. This urine test is to evaluate you for a possible urinary tract infection, and should be scheduled for today or tomorrow. Schedule a Lab Only appointment here. Its possible this is a UTI but we will do some tests to be sure.    Lab appointments are not available at most locations on the weekends. If no Lab Only appointment is available, you should be seen in any of our convenient Walk-in or Urgent Care Centers, which can be found on our website here.     You will receive instructions with your results in [employer] once they are available.     If your symptoms worsen, you develop pain in your back or stomach, develop fevers, or are not improving in 5 days, please contact your primary care provider for an appointment or visit a Walk-in or Urgent Care Center to be seen.     Thanks again for choosing us as your health care partner,     Danial Armendariz PA-C

## 2022-03-04 ENCOUNTER — MYC MEDICAL ADVICE (OUTPATIENT)
Dept: FAMILY MEDICINE | Facility: CLINIC | Age: 22
End: 2022-03-04
Payer: COMMERCIAL

## 2022-03-04 NOTE — TELEPHONE ENCOUNTER
See [employer] message below.     Routing to provider to review and advise.    Cassandra Kim RN  [facility]

## 2022-03-11 NOTE — CONFIDENTIAL NOTE
Routing to PCP, please review and advise on MyC messages below.  Patient sent original message on 3/4/22 and notes not hearing back from anyone as of yet.      Lucinda Mott RN  [facility]

## 2022-03-15 ENCOUNTER — MYC MEDICAL ADVICE (OUTPATIENT)
Dept: FAMILY MEDICINE | Facility: CLINIC | Age: 22
End: 2022-03-15
Payer: COMMERCIAL

## 2022-03-15 NOTE — TELEPHONE ENCOUNTER
Routing to provider to review and advise. Please see mychart message below and [employer] message on 3/4/22.    Maritza Alvarado RN, BSN  [facility]

## 2022-03-23 ENCOUNTER — VIRTUAL VISIT (OUTPATIENT)
Dept: FAMILY MEDICINE | Facility: CLINIC | Age: 22
End: 2022-03-23
Payer: COMMERCIAL

## 2022-03-23 DIAGNOSIS — F32.1 MODERATE SINGLE CURRENT EPISODE OF MAJOR DEPRESSIVE DISORDER (H): Primary | ICD-10-CM

## 2022-03-23 DIAGNOSIS — Z30.41 ENCOUNTER FOR SURVEILLANCE OF CONTRACEPTIVE PILLS: ICD-10-CM

## 2022-03-23 PROCEDURE — 99213 OFFICE O/P EST LOW 20 MIN: CPT | Mod: 95 | Performed by: NURSE PRACTITIONER

## 2022-03-23 RX ORDER — NORETHINDRONE AND ETHINYL ESTRADIOL 1 MG-35MCG
1 KIT ORAL DAILY
Qty: 84 TABLET | Refills: 1 | Status: SHIPPED | OUTPATIENT
Start: 2022-03-23

## 2022-03-23 NOTE — PROGRESS NOTES
Ana is a 21 year old who is being evaluated via a billable video visit.      How would you like to obtain your AVS? MyChart  If the video visit is dropped, the invitation should be resent by: Text to cell phone: .  Will anyone else be joining your video visit? No      Video Start Time: 5:46 PM    Assessment & Plan     Moderate single current episode of major depressive disorder (H)  Patient wanting to wean off Prozac- reviewed titration schedule- decrease dose to 40 mg daily for 1 week, then to 20 mg daily for 1 week, then 20 mg every other day for 3 doses, then off. Call for new/worsening symptoms.    Encounter for surveillance of contraceptive pills  Refilled COCP.  - norethindrone-ethinyl estradiol (ALAYCEN 1/35) 1-35 MG-MCG tablet  Dispense: 84 tablet; Refill: 1      Prescription drug management  20 minutes spent on the date of the encounter doing chart review, history and exam, documentation and further activities per the note           No follow-ups on file.    AYDEE Jimenez [facility]    Brian Perez is a 21 year old who presents for the following health issues     HPI       Depression and Anxiety Follow-Up    How are you doing with your depression since your last visit? Improved -wants to wean off Prozac- notes fatigue and little energy to do things on the 60 mg dose, no longer feels depressed or anxious.      How are you doing with your anxiety since your last visit?  Improved     Are you having other symptoms that might be associated with depression or anxiety? No    Have you had a significant life event? No     Do you have any concerns with your use of alcohol or other drugs? No    Social History     Tobacco Use     Smoking status: Never Smoker     Smokeless tobacco: Never Used   Substance Use Topics     Alcohol use: No     Drug use: No     PHQ 1/25/2021 1/20/2022 2/7/2022   PHQ-9 Total Score 3 2 3   Q9: Thoughts of better off dead/self-harm past 2 weeks  Not at all Not at all Not at all     DAGO-7 SCORE 1/25/2021 1/20/2022 2/7/2022   Total Score - - 3 (minimal anxiety)   Total Score 6 13 3     Last PHQ-9 2/7/2022   1.  Little interest or pleasure in doing things 0   2.  Feeling down, depressed, or hopeless 0   3.  Trouble falling or staying asleep, or sleeping too much 1   4.  Feeling tired or having little energy 1   5.  Poor appetite or overeating 1   6.  Feeling bad about yourself 0   7.  Trouble concentrating 0   8.  Moving slowly or restless 0   Q9: Thoughts of better off dead/self-harm past 2 weeks 0   PHQ-9 Total Score 3   Difficulty at work, home, or with people -     DAGO-7  2/7/2022   1. Feeling nervous, anxious, or on edge 0   2. Not being able to stop or control worrying 1   3. Worrying too much about different things 1   4. Trouble relaxing 0   5. Being so restless that it is hard to sit still 1   6. Becoming easily annoyed or irritable 0   7. Feeling afraid, as if something awful might happen 0   DAGO-7 Total Score 3   If you checked any problems, how difficult have they made it for you to do your work, take care of things at home, or get along with other people? -       Suicide Assessment Five-step Evaluation and Treatment (SAFE-T)      How many servings of fruits and vegetables do you eat daily?  2-3    On average, how many sweetened beverages do you drink each day (Examples: soda, juice, sweet tea, etc.  Do NOT count diet or artificially sweetened beverages)?   0    How many days per week do you exercise enough to make your heart beat faster? 4    How many minutes a day do you exercise enough to make your heart beat faster? 30 - 60    How many days per week do you miss taking your medication? 0    Also requests refill on COCP's which she is tolerating well. Due for STI screen but is still on [facility]- will be back to the [facility] at the end of May. Last pap 5/2020 NIL.    Review of Systems   Constitutional, HEENT, cardiovascular, pulmonary, gi and gu  systems are negative, except as otherwise noted.      Objective           Vitals:  No vitals were obtained today due to virtual visit.    Physical Exam   GENERAL: Healthy, alert and no distress  EYES: Eyes grossly normal to inspection.  No discharge or erythema, or obvious scleral/conjunctival abnormalities.  HENT: Normal cephalic/atraumatic.  External ears, nose and mouth without ulcers or lesions.  No nasal drainage visible.  NECK: No asymmetry, visible masses or scars  RESP: No audible wheeze, cough, or visible cyanosis.  No visible retractions or increased work of breathing.    SKIN: Visible skin clear. No significant rash, abnormal pigmentation or lesions.  NEURO: Cranial nerves grossly intact.  Mentation and speech appropriate for age.  PSYCH: Mentation appears normal, affect normal/bright, judgement and insight intact, normal speech and appearance well-groomed.      Video-Visit Details    Type of service:  Video Visit    Video End Time:5PM    Originating Location (pt. Location): Home    Distant Location (provider location):  [facility]     Platform used for Video Visit: MEPS Real-Time

## 2022-10-13 ENCOUNTER — TELEPHONE (OUTPATIENT)
Dept: FAMILY MEDICINE | Facility: CLINIC | Age: 22
End: 2022-10-13

## 2023-12-03 ENCOUNTER — HEALTH MAINTENANCE LETTER (OUTPATIENT)
Age: 23
End: 2023-12-03

## 2025-01-05 ENCOUNTER — HEALTH MAINTENANCE LETTER (OUTPATIENT)
Age: 25
End: 2025-01-05